# Patient Record
Sex: MALE | Race: WHITE
[De-identification: names, ages, dates, MRNs, and addresses within clinical notes are randomized per-mention and may not be internally consistent; named-entity substitution may affect disease eponyms.]

---

## 2019-12-02 ENCOUNTER — HOSPITAL ENCOUNTER (EMERGENCY)
Dept: HOSPITAL 94 - ER | Age: 34
Discharge: HOME | End: 2019-12-02
Payer: MEDICAID

## 2019-12-02 VITALS — HEIGHT: 74 IN | BODY MASS INDEX: 40.43 KG/M2 | WEIGHT: 315 LBS

## 2019-12-02 VITALS — DIASTOLIC BLOOD PRESSURE: 97 MMHG | SYSTOLIC BLOOD PRESSURE: 141 MMHG

## 2019-12-02 DIAGNOSIS — E11.9: ICD-10-CM

## 2019-12-02 DIAGNOSIS — J44.9: Primary | ICD-10-CM

## 2019-12-02 DIAGNOSIS — R11.2: ICD-10-CM

## 2019-12-02 DIAGNOSIS — F12.10: ICD-10-CM

## 2019-12-02 DIAGNOSIS — Z90.49: ICD-10-CM

## 2019-12-02 DIAGNOSIS — Z88.0: ICD-10-CM

## 2019-12-02 LAB
ALBUMIN SERPL BCP-MCNC: 3.4 G/DL (ref 3.4–5)
ALBUMIN/GLOB SERPL: 0.7 {RATIO} (ref 1.1–1.5)
ALP SERPL-CCNC: 103 IU/L (ref 46–116)
ALT SERPL W P-5'-P-CCNC: 42 U/L (ref 12–78)
ANION GAP SERPL CALCULATED.3IONS-SCNC: 8 MMOL/L (ref 8–16)
AST SERPL W P-5'-P-CCNC: 29 U/L (ref 10–37)
BASOPHILS # BLD AUTO: 0 X10'3 (ref 0–0.2)
BASOPHILS NFR BLD AUTO: 0.4 % (ref 0–1)
BILIRUB SERPL-MCNC: 0.3 MG/DL (ref 0.1–1)
BUN SERPL-MCNC: 10 MG/DL (ref 7–18)
BUN/CREAT SERPL: 11.5 (ref 5.4–32)
CALCIUM SERPL-MCNC: 8.7 MG/DL (ref 8.5–10.1)
CHLORIDE SERPL-SCNC: 98 MMOL/L (ref 99–107)
CO2 SERPL-SCNC: 32.9 MMOL/L (ref 24–32)
CREAT SERPL-MCNC: 0.87 MG/DL (ref 0.6–1.1)
EOSINOPHIL # BLD AUTO: 0 X10'3 (ref 0–0.9)
EOSINOPHIL NFR BLD AUTO: 0 % (ref 0–6)
ERYTHROCYTE [DISTWIDTH] IN BLOOD BY AUTOMATED COUNT: 14.9 % (ref 11.5–14.5)
GFR SERPL CREATININE-BSD FRML MDRD: > 90 ML/MIN
GLUCOSE SERPL-MCNC: 305 MG/DL (ref 70–104)
HCT VFR BLD AUTO: 45.4 % (ref 42–52)
HGB BLD-MCNC: 14.7 G/DL (ref 14–17.9)
LIPASE SERPL-CCNC: 86 U/L (ref 73–393)
LYMPHOCYTES # BLD AUTO: 0.7 X10'3 (ref 1.1–4.8)
LYMPHOCYTES NFR BLD AUTO: 13.8 % (ref 21–51)
MCH RBC QN AUTO: 25.8 PG (ref 27–31)
MCHC RBC AUTO-ENTMCNC: 32.4 G/DL (ref 33–36.5)
MCV RBC AUTO: 79.5 FL (ref 78–98)
MONOCYTES # BLD AUTO: 0.7 X10'3 (ref 0–0.9)
MONOCYTES NFR BLD AUTO: 12.4 % (ref 2–12)
NEUTROPHILS # BLD AUTO: 3.9 X10'3 (ref 1.8–7.7)
NEUTROPHILS NFR BLD AUTO: 73.4 % (ref 42–75)
PLATELET # BLD AUTO: 219 X10'3 (ref 140–440)
PMV BLD AUTO: 8.7 FL (ref 7.4–10.4)
POTASSIUM SERPL-SCNC: 3.8 MMOL/L (ref 3.5–5.1)
PROT SERPL-MCNC: 8.1 G/DL (ref 6.4–8.2)
RBC # BLD AUTO: 5.72 X10'6 (ref 4.7–6.1)
SODIUM SERPL-SCNC: 139 MMOL/L (ref 135–145)
WBC # BLD AUTO: 5.3 X10'3 (ref 4.5–11)

## 2019-12-02 PROCEDURE — 94760 N-INVAS EAR/PLS OXIMETRY 1: CPT

## 2019-12-02 PROCEDURE — 87040 BLOOD CULTURE FOR BACTERIA: CPT

## 2019-12-02 PROCEDURE — 99284 EMERGENCY DEPT VISIT MOD MDM: CPT

## 2019-12-02 PROCEDURE — 84145 PROCALCITONIN (PCT): CPT

## 2019-12-02 PROCEDURE — 96374 THER/PROPH/DIAG INJ IV PUSH: CPT

## 2019-12-02 PROCEDURE — 94640 AIRWAY INHALATION TREATMENT: CPT

## 2019-12-02 PROCEDURE — 85025 COMPLETE CBC W/AUTO DIFF WBC: CPT

## 2019-12-02 PROCEDURE — 96375 TX/PRO/DX INJ NEW DRUG ADDON: CPT

## 2019-12-02 PROCEDURE — 96361 HYDRATE IV INFUSION ADD-ON: CPT

## 2019-12-02 PROCEDURE — 83690 ASSAY OF LIPASE: CPT

## 2019-12-02 PROCEDURE — 36415 COLL VENOUS BLD VENIPUNCTURE: CPT

## 2019-12-02 PROCEDURE — 80053 COMPREHEN METABOLIC PANEL: CPT

## 2019-12-02 PROCEDURE — 93005 ELECTROCARDIOGRAM TRACING: CPT

## 2019-12-02 PROCEDURE — 83605 ASSAY OF LACTIC ACID: CPT

## 2022-12-12 ENCOUNTER — HOSPITAL ENCOUNTER (INPATIENT)
Facility: MEDICAL CENTER | Age: 37
LOS: 4 days | DRG: 603 | End: 2022-12-16
Attending: INTERNAL MEDICINE | Admitting: STUDENT IN AN ORGANIZED HEALTH CARE EDUCATION/TRAINING PROGRAM
Payer: COMMERCIAL

## 2022-12-12 DIAGNOSIS — E13.628 OTHER SPECIFIED DIABETES MELLITUS WITH OTHER SKIN COMPLICATION, UNSPECIFIED WHETHER LONG TERM INSULIN USE (HCC): ICD-10-CM

## 2022-12-12 DIAGNOSIS — L02.215 PERINEAL ABSCESS: ICD-10-CM

## 2022-12-12 PROCEDURE — 770001 HCHG ROOM/CARE - MED/SURG/GYN PRIV*

## 2022-12-12 RX ORDER — ACETAMINOPHEN 325 MG/1
650 TABLET ORAL EVERY 6 HOURS PRN
Status: ACTIVE | OUTPATIENT
Start: 2022-12-12 | End: 2022-12-13

## 2022-12-12 RX ORDER — PRAZOSIN HYDROCHLORIDE 2 MG/1
4 CAPSULE ORAL NIGHTLY
COMMUNITY

## 2022-12-12 ASSESSMENT — PATIENT HEALTH QUESTIONNAIRE - PHQ9
1. LITTLE INTEREST OR PLEASURE IN DOING THINGS: NOT AT ALL
2. FEELING DOWN, DEPRESSED, IRRITABLE, OR HOPELESS: NOT AT ALL
SUM OF ALL RESPONSES TO PHQ9 QUESTIONS 1 AND 2: 0

## 2022-12-12 NOTE — PROGRESS NOTES
[facility]PITAL[facility] TRIAGE OFFICER DIRECT ADMISSION REPORT  Transferring facility: [facility] in [place]  Transferring physician: Dr Lobo  Transferring facility/physician contact number:   Chief complaint: Pain in his perineum  Pertinent history & patient course: Patient recently had a perineal abscess that was drained in [place] by a PA, it was deemed superficial at that time.  Patient was given antibiotics and completed his course of antibiotics.  Patient states shortly thereafter he began having pain and swelling again.  Because of that he presented to the emergency department.  Pertinent imaging & lab results: Perineal abscess on CT scan  Code Status: Full per transferring provider, I personally verified with the transferring provider patient's code status and the transferring provider has confirmed this with the patient.  Further work up or recommendations per triage officer prior to transfer: None  Consultants called prior to transfer and pertinent input from consultants: None  Patient accepted for transfer: Yes  Consultants to be called upon arrival: Will likely need surgical intervention, I do not anticipate resolution with IV antibiotics only  Admission status: Inpatient.   Floor requested: MedSur  If ICU transfer, name of intensivist case discussed with and pertinent input from critical care: N/A    Please inform the triage officer upon arrival of the patient to [facility] for assignment of a hospitalist to perform admission.     For any question or concerns regarding the care of this patient, please reach out to the assigned hospitalist.

## 2022-12-13 ENCOUNTER — ANESTHESIA EVENT (OUTPATIENT)
Dept: SURGERY | Facility: MEDICAL CENTER | Age: 37
DRG: 603 | End: 2022-12-13
Payer: COMMERCIAL

## 2022-12-13 ENCOUNTER — ANESTHESIA (OUTPATIENT)
Dept: SURGERY | Facility: MEDICAL CENTER | Age: 37
DRG: 603 | End: 2022-12-13
Payer: COMMERCIAL

## 2022-12-13 ENCOUNTER — HOSPITAL ENCOUNTER (OUTPATIENT)
Dept: RADIOLOGY | Facility: MEDICAL CENTER | Age: 37
End: 2022-12-13

## 2022-12-13 ENCOUNTER — APPOINTMENT (OUTPATIENT)
Dept: RADIOLOGY | Facility: MEDICAL CENTER | Age: 37
DRG: 603 | End: 2022-12-13
Attending: INTERNAL MEDICINE
Payer: COMMERCIAL

## 2022-12-13 PROBLEM — E87.1 HYPONATREMIA: Status: ACTIVE | Noted: 2022-12-13

## 2022-12-13 PROBLEM — E66.01 MORBID OBESITY WITH BMI OF 60.0-69.9, ADULT (HCC): Status: ACTIVE | Noted: 2022-12-13

## 2022-12-13 PROBLEM — E66.9 SUPER OBESE: Status: ACTIVE | Noted: 2022-12-13

## 2022-12-13 PROBLEM — E66.9 SUPER OBESE: Status: RESOLVED | Noted: 2022-12-13 | Resolved: 2022-12-13

## 2022-12-13 PROBLEM — E11.9 DIABETES MELLITUS (HCC): Status: ACTIVE | Noted: 2022-12-13

## 2022-12-13 LAB
ALBUMIN SERPL BCP-MCNC: 3.8 G/DL (ref 3.2–4.9)
ALBUMIN/GLOB SERPL: 1 G/DL
ALP SERPL-CCNC: 89 U/L (ref 30–99)
ALT SERPL-CCNC: 19 U/L (ref 2–50)
ANION GAP SERPL CALC-SCNC: 8 MMOL/L (ref 7–16)
AST SERPL-CCNC: 17 U/L (ref 12–45)
BASOPHILS # BLD AUTO: 0.9 % (ref 0–1.8)
BASOPHILS # BLD: 0.1 K/UL (ref 0–0.12)
BILIRUB SERPL-MCNC: 0.5 MG/DL (ref 0.1–1.5)
BUN SERPL-MCNC: 13 MG/DL (ref 8–22)
CALCIUM SERPL-MCNC: 9.1 MG/DL (ref 8.5–10.5)
CHLORIDE SERPL-SCNC: 99 MMOL/L (ref 96–112)
CO2 SERPL-SCNC: 27 MMOL/L (ref 20–33)
CREAT SERPL-MCNC: 0.65 MG/DL (ref 0.5–1.4)
EKG IMPRESSION: NORMAL
EOSINOPHIL # BLD AUTO: 0.46 K/UL (ref 0–0.51)
EOSINOPHIL NFR BLD: 4 % (ref 0–6.9)
ERYTHROCYTE [DISTWIDTH] IN BLOOD BY AUTOMATED COUNT: 41.2 FL (ref 35.9–50)
EST. AVERAGE GLUCOSE BLD GHB EST-MCNC: 163 MG/DL
GFR SERPLBLD CREATININE-BSD FMLA CKD-EPI: 125 ML/MIN/1.73 M 2
GLOBULIN SER CALC-MCNC: 3.7 G/DL (ref 1.9–3.5)
GLUCOSE BLD STRIP.AUTO-MCNC: 130 MG/DL (ref 65–99)
GLUCOSE BLD STRIP.AUTO-MCNC: 160 MG/DL (ref 65–99)
GLUCOSE BLD STRIP.AUTO-MCNC: 164 MG/DL (ref 65–99)
GLUCOSE BLD STRIP.AUTO-MCNC: 199 MG/DL (ref 65–99)
GLUCOSE SERPL-MCNC: 142 MG/DL (ref 65–99)
GRAM STN SPEC: NORMAL
GRAM STN SPEC: NORMAL
HBA1C MFR BLD: 7.3 % (ref 4–5.6)
HCT VFR BLD AUTO: 45 % (ref 42–52)
HGB BLD-MCNC: 14.1 G/DL (ref 14–18)
IMM GRANULOCYTES # BLD AUTO: 0.03 K/UL (ref 0–0.11)
IMM GRANULOCYTES NFR BLD AUTO: 0.3 % (ref 0–0.9)
LACTATE SERPL-SCNC: 1.1 MMOL/L (ref 0.5–2)
LACTATE SERPL-SCNC: 1.2 MMOL/L (ref 0.5–2)
LYMPHOCYTES # BLD AUTO: 2.34 K/UL (ref 1–4.8)
LYMPHOCYTES NFR BLD: 20.3 % (ref 22–41)
MCH RBC QN AUTO: 25.4 PG (ref 27–33)
MCHC RBC AUTO-ENTMCNC: 31.3 G/DL (ref 33.7–35.3)
MCV RBC AUTO: 80.9 FL (ref 81.4–97.8)
MONOCYTES # BLD AUTO: 0.81 K/UL (ref 0–0.85)
MONOCYTES NFR BLD AUTO: 7 % (ref 0–13.4)
NEUTROPHILS # BLD AUTO: 7.81 K/UL (ref 1.82–7.42)
NEUTROPHILS NFR BLD: 67.5 % (ref 44–72)
NRBC # BLD AUTO: 0 K/UL
NRBC BLD-RTO: 0 /100 WBC
PLATELET # BLD AUTO: 301 K/UL (ref 164–446)
PMV BLD AUTO: 9.8 FL (ref 9–12.9)
POTASSIUM SERPL-SCNC: 4.1 MMOL/L (ref 3.6–5.5)
PROCALCITONIN SERPL-MCNC: 0.07 NG/ML
PROT SERPL-MCNC: 7.5 G/DL (ref 6–8.2)
RBC # BLD AUTO: 5.56 M/UL (ref 4.7–6.1)
SCCMEC + MECA PNL NOSE NAA+PROBE: NEGATIVE
SIGNIFICANT IND 70042: NORMAL
SIGNIFICANT IND 70042: NORMAL
SITE SITE: NORMAL
SITE SITE: NORMAL
SODIUM SERPL-SCNC: 134 MMOL/L (ref 135–145)
SOURCE SOURCE: NORMAL
SOURCE SOURCE: NORMAL
WBC # BLD AUTO: 11.6 K/UL (ref 4.8–10.8)

## 2022-12-13 PROCEDURE — 700111 HCHG RX REV CODE 636 W/ 250 OVERRIDE (IP): Performed by: STUDENT IN AN ORGANIZED HEALTH CARE EDUCATION/TRAINING PROGRAM

## 2022-12-13 PROCEDURE — 0Y900ZZ DRAINAGE OF RIGHT BUTTOCK, OPEN APPROACH: ICD-10-PCS | Performed by: SURGERY

## 2022-12-13 PROCEDURE — 700101 HCHG RX REV CODE 250: Performed by: STUDENT IN AN ORGANIZED HEALTH CARE EDUCATION/TRAINING PROGRAM

## 2022-12-13 PROCEDURE — A9270 NON-COVERED ITEM OR SERVICE: HCPCS | Performed by: INTERNAL MEDICINE

## 2022-12-13 PROCEDURE — 700105 HCHG RX REV CODE 258: Performed by: STUDENT IN AN ORGANIZED HEALTH CARE EDUCATION/TRAINING PROGRAM

## 2022-12-13 PROCEDURE — 83605 ASSAY OF LACTIC ACID: CPT | Mod: 91

## 2022-12-13 PROCEDURE — 10061 I&D ABSCESS COMP/MULTIPLE: CPT | Performed by: SURGERY

## 2022-12-13 PROCEDURE — 700102 HCHG RX REV CODE 250 W/ 637 OVERRIDE(OP): Performed by: INTERNAL MEDICINE

## 2022-12-13 PROCEDURE — 87040 BLOOD CULTURE FOR BACTERIA: CPT | Mod: 91

## 2022-12-13 PROCEDURE — 99140 ANES COMP EMERGENCY COND: CPT | Performed by: STUDENT IN AN ORGANIZED HEALTH CARE EDUCATION/TRAINING PROGRAM

## 2022-12-13 PROCEDURE — 00400 ANES INTEGUMENTARY SYS NOS: CPT | Performed by: STUDENT IN AN ORGANIZED HEALTH CARE EDUCATION/TRAINING PROGRAM

## 2022-12-13 PROCEDURE — 770001 HCHG ROOM/CARE - MED/SURG/GYN PRIV*

## 2022-12-13 PROCEDURE — 160009 HCHG ANES TIME/MIN: Performed by: SURGERY

## 2022-12-13 PROCEDURE — 87205 SMEAR GRAM STAIN: CPT | Mod: 91

## 2022-12-13 PROCEDURE — 160035 HCHG PACU - 1ST 60 MINS PHASE I: Performed by: SURGERY

## 2022-12-13 PROCEDURE — 700105 HCHG RX REV CODE 258: Performed by: INTERNAL MEDICINE

## 2022-12-13 PROCEDURE — 87075 CULTR BACTERIA EXCEPT BLOOD: CPT

## 2022-12-13 PROCEDURE — A9270 NON-COVERED ITEM OR SERVICE: HCPCS | Performed by: STUDENT IN AN ORGANIZED HEALTH CARE EDUCATION/TRAINING PROGRAM

## 2022-12-13 PROCEDURE — 160027 HCHG SURGERY MINUTES - 1ST 30 MINS LEVEL 2: Performed by: SURGERY

## 2022-12-13 PROCEDURE — 93010 ELECTROCARDIOGRAM REPORT: CPT | Performed by: INTERNAL MEDICINE

## 2022-12-13 PROCEDURE — 87641 MR-STAPH DNA AMP PROBE: CPT

## 2022-12-13 PROCEDURE — 87077 CULTURE AEROBIC IDENTIFY: CPT

## 2022-12-13 PROCEDURE — 36415 COLL VENOUS BLD VENIPUNCTURE: CPT

## 2022-12-13 PROCEDURE — 93005 ELECTROCARDIOGRAM TRACING: CPT | Performed by: SURGERY

## 2022-12-13 PROCEDURE — 87147 CULTURE TYPE IMMUNOLOGIC: CPT

## 2022-12-13 PROCEDURE — 80053 COMPREHEN METABOLIC PANEL: CPT

## 2022-12-13 PROCEDURE — 99253 IP/OBS CNSLTJ NEW/EST LOW 45: CPT | Mod: 57 | Performed by: SURGERY

## 2022-12-13 PROCEDURE — 700102 HCHG RX REV CODE 250 W/ 637 OVERRIDE(OP): Performed by: STUDENT IN AN ORGANIZED HEALTH CARE EDUCATION/TRAINING PROGRAM

## 2022-12-13 PROCEDURE — 99223 1ST HOSP IP/OBS HIGH 75: CPT | Performed by: STUDENT IN AN ORGANIZED HEALTH CARE EDUCATION/TRAINING PROGRAM

## 2022-12-13 PROCEDURE — 83036 HEMOGLOBIN GLYCOSYLATED A1C: CPT

## 2022-12-13 PROCEDURE — 160036 HCHG PACU - EA ADDL 30 MINS PHASE I: Performed by: SURGERY

## 2022-12-13 PROCEDURE — 87070 CULTURE OTHR SPECIMN AEROBIC: CPT | Mod: 91

## 2022-12-13 PROCEDURE — 160002 HCHG RECOVERY MINUTES (STAT): Performed by: SURGERY

## 2022-12-13 PROCEDURE — 85025 COMPLETE CBC W/AUTO DIFF WBC: CPT

## 2022-12-13 PROCEDURE — 87186 SC STD MICRODIL/AGAR DIL: CPT

## 2022-12-13 PROCEDURE — 700111 HCHG RX REV CODE 636 W/ 250 OVERRIDE (IP): Performed by: INTERNAL MEDICINE

## 2022-12-13 PROCEDURE — 160038 HCHG SURGERY MINUTES - EA ADDL 1 MIN LEVEL 2: Performed by: SURGERY

## 2022-12-13 PROCEDURE — 84145 PROCALCITONIN (PCT): CPT

## 2022-12-13 PROCEDURE — 160048 HCHG OR STATISTICAL LEVEL 1-5: Performed by: SURGERY

## 2022-12-13 PROCEDURE — 82962 GLUCOSE BLOOD TEST: CPT | Mod: 91

## 2022-12-13 RX ORDER — SODIUM CHLORIDE, SODIUM LACTATE, POTASSIUM CHLORIDE, CALCIUM CHLORIDE 600; 310; 30; 20 MG/100ML; MG/100ML; MG/100ML; MG/100ML
INJECTION, SOLUTION INTRAVENOUS CONTINUOUS
Status: DISCONTINUED | OUTPATIENT
Start: 2022-12-13 | End: 2022-12-14

## 2022-12-13 RX ORDER — MEPERIDINE HYDROCHLORIDE 25 MG/ML
6.25 INJECTION INTRAMUSCULAR; INTRAVENOUS; SUBCUTANEOUS
Status: DISCONTINUED | OUTPATIENT
Start: 2022-12-13 | End: 2022-12-13 | Stop reason: HOSPADM

## 2022-12-13 RX ORDER — ONDANSETRON 2 MG/ML
4 INJECTION INTRAMUSCULAR; INTRAVENOUS EVERY 4 HOURS PRN
Status: DISCONTINUED | OUTPATIENT
Start: 2022-12-13 | End: 2022-12-16 | Stop reason: HOSPADM

## 2022-12-13 RX ORDER — HYDROMORPHONE HYDROCHLORIDE 1 MG/ML
0.5 INJECTION, SOLUTION INTRAMUSCULAR; INTRAVENOUS; SUBCUTANEOUS
Status: DISCONTINUED | OUTPATIENT
Start: 2022-12-13 | End: 2022-12-14

## 2022-12-13 RX ORDER — SODIUM CHLORIDE, SODIUM LACTATE, POTASSIUM CHLORIDE, CALCIUM CHLORIDE 600; 310; 30; 20 MG/100ML; MG/100ML; MG/100ML; MG/100ML
INJECTION, SOLUTION INTRAVENOUS CONTINUOUS
Status: DISCONTINUED | OUTPATIENT
Start: 2022-12-13 | End: 2022-12-13 | Stop reason: HOSPADM

## 2022-12-13 RX ORDER — PROMETHAZINE HYDROCHLORIDE 25 MG/1
12.5-25 SUPPOSITORY RECTAL EVERY 4 HOURS PRN
Status: DISCONTINUED | OUTPATIENT
Start: 2022-12-13 | End: 2022-12-16 | Stop reason: HOSPADM

## 2022-12-13 RX ORDER — SODIUM CHLORIDE, SODIUM LACTATE, POTASSIUM CHLORIDE, CALCIUM CHLORIDE 600; 310; 30; 20 MG/100ML; MG/100ML; MG/100ML; MG/100ML
INJECTION, SOLUTION INTRAVENOUS
Status: DISCONTINUED | OUTPATIENT
Start: 2022-12-13 | End: 2022-12-13 | Stop reason: SURG

## 2022-12-13 RX ORDER — KETOROLAC TROMETHAMINE 30 MG/ML
30 INJECTION, SOLUTION INTRAMUSCULAR; INTRAVENOUS ONCE
Status: COMPLETED | OUTPATIENT
Start: 2022-12-13 | End: 2022-12-13

## 2022-12-13 RX ORDER — HYDROMORPHONE HYDROCHLORIDE 1 MG/ML
0.1 INJECTION, SOLUTION INTRAMUSCULAR; INTRAVENOUS; SUBCUTANEOUS
Status: DISCONTINUED | OUTPATIENT
Start: 2022-12-13 | End: 2022-12-13 | Stop reason: HOSPADM

## 2022-12-13 RX ORDER — OXYCODONE HYDROCHLORIDE 10 MG/1
10 TABLET ORAL EVERY 4 HOURS PRN
Status: DISCONTINUED | OUTPATIENT
Start: 2022-12-13 | End: 2022-12-13

## 2022-12-13 RX ORDER — ROCURONIUM BROMIDE 10 MG/ML
INJECTION, SOLUTION INTRAVENOUS PRN
Status: DISCONTINUED | OUTPATIENT
Start: 2022-12-13 | End: 2022-12-13 | Stop reason: SURG

## 2022-12-13 RX ORDER — IBUPROFEN 800 MG/1
800 TABLET ORAL 3 TIMES DAILY PRN
Status: DISCONTINUED | OUTPATIENT
Start: 2022-12-16 | End: 2022-12-16 | Stop reason: HOSPADM

## 2022-12-13 RX ORDER — MIDAZOLAM HYDROCHLORIDE 1 MG/ML
INJECTION INTRAMUSCULAR; INTRAVENOUS PRN
Status: DISCONTINUED | OUTPATIENT
Start: 2022-12-13 | End: 2022-12-13 | Stop reason: SURG

## 2022-12-13 RX ORDER — DIPHENHYDRAMINE HYDROCHLORIDE 50 MG/ML
12.5 INJECTION INTRAMUSCULAR; INTRAVENOUS
Status: DISCONTINUED | OUTPATIENT
Start: 2022-12-13 | End: 2022-12-13 | Stop reason: HOSPADM

## 2022-12-13 RX ORDER — KETOROLAC TROMETHAMINE 30 MG/ML
30 INJECTION, SOLUTION INTRAMUSCULAR; INTRAVENOUS EVERY 6 HOURS
Status: DISCONTINUED | OUTPATIENT
Start: 2022-12-13 | End: 2022-12-16 | Stop reason: HOSPADM

## 2022-12-13 RX ORDER — ACETAMINOPHEN 500 MG
TABLET ORAL
Status: COMPLETED
Start: 2022-12-13 | End: 2022-12-13

## 2022-12-13 RX ORDER — HYDROMORPHONE HYDROCHLORIDE 2 MG/ML
INJECTION, SOLUTION INTRAMUSCULAR; INTRAVENOUS; SUBCUTANEOUS PRN
Status: DISCONTINUED | OUTPATIENT
Start: 2022-12-13 | End: 2022-12-13 | Stop reason: SURG

## 2022-12-13 RX ORDER — ONDANSETRON 2 MG/ML
4 INJECTION INTRAMUSCULAR; INTRAVENOUS
Status: DISCONTINUED | OUTPATIENT
Start: 2022-12-13 | End: 2022-12-13 | Stop reason: HOSPADM

## 2022-12-13 RX ORDER — OXYCODONE HCL 5 MG/5 ML
10 SOLUTION, ORAL ORAL
Status: COMPLETED | OUTPATIENT
Start: 2022-12-13 | End: 2022-12-13

## 2022-12-13 RX ORDER — LABETALOL HYDROCHLORIDE 5 MG/ML
5 INJECTION, SOLUTION INTRAVENOUS
Status: DISCONTINUED | OUTPATIENT
Start: 2022-12-13 | End: 2022-12-13 | Stop reason: HOSPADM

## 2022-12-13 RX ORDER — PROCHLORPERAZINE EDISYLATE 5 MG/ML
5-10 INJECTION INTRAMUSCULAR; INTRAVENOUS EVERY 4 HOURS PRN
Status: DISCONTINUED | OUTPATIENT
Start: 2022-12-13 | End: 2022-12-16 | Stop reason: HOSPADM

## 2022-12-13 RX ORDER — OXYCODONE HYDROCHLORIDE 5 MG/1
2.5 TABLET ORAL
Status: DISCONTINUED | OUTPATIENT
Start: 2022-12-13 | End: 2022-12-14

## 2022-12-13 RX ORDER — HALOPERIDOL 5 MG/ML
1 INJECTION INTRAMUSCULAR
Status: DISCONTINUED | OUTPATIENT
Start: 2022-12-13 | End: 2022-12-13 | Stop reason: HOSPADM

## 2022-12-13 RX ORDER — PRAZOSIN HYDROCHLORIDE 2 MG/1
4 CAPSULE ORAL NIGHTLY
Status: DISCONTINUED | OUTPATIENT
Start: 2022-12-13 | End: 2022-12-16 | Stop reason: HOSPADM

## 2022-12-13 RX ORDER — PROMETHAZINE HYDROCHLORIDE 25 MG/1
12.5-25 TABLET ORAL EVERY 4 HOURS PRN
Status: DISCONTINUED | OUTPATIENT
Start: 2022-12-13 | End: 2022-12-16 | Stop reason: HOSPADM

## 2022-12-13 RX ORDER — OXYCODONE HYDROCHLORIDE 5 MG/1
5 TABLET ORAL
Status: DISCONTINUED | OUTPATIENT
Start: 2022-12-13 | End: 2022-12-14

## 2022-12-13 RX ORDER — HYDROMORPHONE HYDROCHLORIDE 1 MG/ML
0.2 INJECTION, SOLUTION INTRAMUSCULAR; INTRAVENOUS; SUBCUTANEOUS
Status: DISCONTINUED | OUTPATIENT
Start: 2022-12-13 | End: 2022-12-13 | Stop reason: HOSPADM

## 2022-12-13 RX ORDER — HYDRALAZINE HYDROCHLORIDE 20 MG/ML
5 INJECTION INTRAMUSCULAR; INTRAVENOUS
Status: DISCONTINUED | OUTPATIENT
Start: 2022-12-13 | End: 2022-12-13 | Stop reason: HOSPADM

## 2022-12-13 RX ORDER — METRONIDAZOLE 500 MG/100ML
500 INJECTION, SOLUTION INTRAVENOUS EVERY 8 HOURS
Status: DISCONTINUED | OUTPATIENT
Start: 2022-12-13 | End: 2022-12-15

## 2022-12-13 RX ORDER — ACETAMINOPHEN 500 MG
1000 TABLET ORAL EVERY 6 HOURS
Status: DISCONTINUED | OUTPATIENT
Start: 2022-12-13 | End: 2022-12-16 | Stop reason: HOSPADM

## 2022-12-13 RX ORDER — OXYCODONE HCL 5 MG/5 ML
5 SOLUTION, ORAL ORAL
Status: COMPLETED | OUTPATIENT
Start: 2022-12-13 | End: 2022-12-13

## 2022-12-13 RX ORDER — ONDANSETRON 4 MG/1
4 TABLET, ORALLY DISINTEGRATING ORAL EVERY 4 HOURS PRN
Status: DISCONTINUED | OUTPATIENT
Start: 2022-12-13 | End: 2022-12-16 | Stop reason: HOSPADM

## 2022-12-13 RX ORDER — HYDROMORPHONE HYDROCHLORIDE 1 MG/ML
1 INJECTION, SOLUTION INTRAMUSCULAR; INTRAVENOUS; SUBCUTANEOUS
Status: DISCONTINUED | OUTPATIENT
Start: 2022-12-13 | End: 2022-12-13

## 2022-12-13 RX ORDER — LINEZOLID 2 MG/ML
600 INJECTION, SOLUTION INTRAVENOUS EVERY 12 HOURS
Status: DISCONTINUED | OUTPATIENT
Start: 2022-12-13 | End: 2022-12-14

## 2022-12-13 RX ORDER — HYDROMORPHONE HYDROCHLORIDE 1 MG/ML
0.5 INJECTION, SOLUTION INTRAMUSCULAR; INTRAVENOUS; SUBCUTANEOUS
Status: DISCONTINUED | OUTPATIENT
Start: 2022-12-13 | End: 2022-12-13 | Stop reason: HOSPADM

## 2022-12-13 RX ORDER — ACETAMINOPHEN 500 MG
1000 TABLET ORAL EVERY 6 HOURS PRN
Status: DISCONTINUED | OUTPATIENT
Start: 2022-12-18 | End: 2022-12-16 | Stop reason: HOSPADM

## 2022-12-13 RX ADMIN — KETOROLAC TROMETHAMINE 30 MG: 30 INJECTION, SOLUTION INTRAMUSCULAR; INTRAVENOUS at 11:41

## 2022-12-13 RX ADMIN — SODIUM CHLORIDE, POTASSIUM CHLORIDE, SODIUM LACTATE AND CALCIUM CHLORIDE: 600; 310; 30; 20 INJECTION, SOLUTION INTRAVENOUS at 00:56

## 2022-12-13 RX ADMIN — HYDROMORPHONE HYDROCHLORIDE 0.5 MG: 1 INJECTION, SOLUTION INTRAMUSCULAR; INTRAVENOUS; SUBCUTANEOUS at 18:02

## 2022-12-13 RX ADMIN — HYDROMORPHONE HYDROCHLORIDE 0.5 MG: 1 INJECTION, SOLUTION INTRAMUSCULAR; INTRAVENOUS; SUBCUTANEOUS at 23:44

## 2022-12-13 RX ADMIN — HYDROMORPHONE HYDROCHLORIDE 1 MG: 1 INJECTION, SOLUTION INTRAMUSCULAR; INTRAVENOUS; SUBCUTANEOUS at 13:15

## 2022-12-13 RX ADMIN — HYDROMORPHONE HYDROCHLORIDE 1 MG: 1 INJECTION, SOLUTION INTRAMUSCULAR; INTRAVENOUS; SUBCUTANEOUS at 00:51

## 2022-12-13 RX ADMIN — SODIUM CHLORIDE, POTASSIUM CHLORIDE, SODIUM LACTATE AND CALCIUM CHLORIDE: 600; 310; 30; 20 INJECTION, SOLUTION INTRAVENOUS at 21:53

## 2022-12-13 RX ADMIN — MIDAZOLAM HYDROCHLORIDE 4 MG: 1 INJECTION, SOLUTION INTRAMUSCULAR; INTRAVENOUS at 10:28

## 2022-12-13 RX ADMIN — NICOTINE POLACRILEX 2 MG: 2 GUM, CHEWING BUCCAL at 18:11

## 2022-12-13 RX ADMIN — OXYCODONE 5 MG: 5 TABLET ORAL at 21:40

## 2022-12-13 RX ADMIN — METRONIDAZOLE 500 MG: 5 INJECTION, SOLUTION INTRAVENOUS at 13:19

## 2022-12-13 RX ADMIN — METRONIDAZOLE 500 MG: 5 INJECTION, SOLUTION INTRAVENOUS at 21:44

## 2022-12-13 RX ADMIN — LINEZOLID 600 MG: 600 INJECTION, SOLUTION INTRAVENOUS at 18:59

## 2022-12-13 RX ADMIN — OXYCODONE HYDROCHLORIDE 10 MG: 10 TABLET ORAL at 01:43

## 2022-12-13 RX ADMIN — PROPOFOL 200 MG: 10 INJECTION, EMULSION INTRAVENOUS at 10:39

## 2022-12-13 RX ADMIN — SODIUM CHLORIDE, POTASSIUM CHLORIDE, SODIUM LACTATE AND CALCIUM CHLORIDE: 600; 310; 30; 20 INJECTION, SOLUTION INTRAVENOUS at 10:28

## 2022-12-13 RX ADMIN — CEFEPIME 2 G: 2 INJECTION, POWDER, FOR SOLUTION INTRAVENOUS at 18:01

## 2022-12-13 RX ADMIN — PRAZOSIN HYDROCHLORIDE 4 MG: 2 CAPSULE ORAL at 21:40

## 2022-12-13 RX ADMIN — METRONIDAZOLE 500 MG: 5 INJECTION, SOLUTION INTRAVENOUS at 02:43

## 2022-12-13 RX ADMIN — SUGAMMADEX 400 MG: 100 INJECTION, SOLUTION INTRAVENOUS at 11:10

## 2022-12-13 RX ADMIN — LINEZOLID 600 MG: 600 INJECTION, SOLUTION INTRAVENOUS at 06:49

## 2022-12-13 RX ADMIN — INSULIN HUMAN 1 UNITS: 100 INJECTION, SOLUTION PARENTERAL at 13:21

## 2022-12-13 RX ADMIN — HYDROMORPHONE HYDROCHLORIDE 1 MG: 1 INJECTION, SOLUTION INTRAMUSCULAR; INTRAVENOUS; SUBCUTANEOUS at 04:33

## 2022-12-13 RX ADMIN — HYDROMORPHONE HYDROCHLORIDE 0.5 MCG: 2 INJECTION INTRAMUSCULAR; INTRAVENOUS; SUBCUTANEOUS at 10:28

## 2022-12-13 RX ADMIN — HYDROMORPHONE HYDROCHLORIDE 1 MG: 1 INJECTION, SOLUTION INTRAMUSCULAR; INTRAVENOUS; SUBCUTANEOUS at 09:22

## 2022-12-13 RX ADMIN — OXYCODONE HYDROCHLORIDE 10 MG: 10 TABLET ORAL at 06:01

## 2022-12-13 RX ADMIN — ALBUTEROL SULFATE 2.5 MG: 2.5 SOLUTION RESPIRATORY (INHALATION) at 11:32

## 2022-12-13 RX ADMIN — ROCURONIUM BROMIDE 100 MG: 10 INJECTION, SOLUTION INTRAVENOUS at 10:39

## 2022-12-13 RX ADMIN — CEFEPIME 2 G: 2 INJECTION, POWDER, FOR SOLUTION INTRAVENOUS at 05:11

## 2022-12-13 RX ADMIN — KETOROLAC TROMETHAMINE 30 MG: 30 INJECTION, SOLUTION INTRAMUSCULAR; INTRAVENOUS at 23:45

## 2022-12-13 RX ADMIN — OXYCODONE HYDROCHLORIDE 10 MG: 5 SOLUTION ORAL at 11:39

## 2022-12-13 RX ADMIN — OXYCODONE HYDROCHLORIDE 10 MG: 10 TABLET ORAL at 15:41

## 2022-12-13 ASSESSMENT — PAIN SCALES - GENERAL: PAIN_LEVEL: 5

## 2022-12-13 ASSESSMENT — LIFESTYLE VARIABLES
AVERAGE NUMBER OF DAYS PER WEEK YOU HAVE A DRINK CONTAINING ALCOHOL: 0
HAVE PEOPLE ANNOYED YOU BY CRITICIZING YOUR DRINKING: NO
ALCOHOL_USE: YES
EVER FELT BAD OR GUILTY ABOUT YOUR DRINKING: NO
ON A TYPICAL DAY WHEN YOU DRINK ALCOHOL HOW MANY DRINKS DO YOU HAVE: 2
TOTAL SCORE: 0
HOW MANY TIMES IN THE PAST YEAR HAVE YOU HAD 5 OR MORE DRINKS IN A DAY: 0
TOTAL SCORE: 0
TOTAL SCORE: 0
HAVE YOU EVER FELT YOU SHOULD CUT DOWN ON YOUR DRINKING: NO
CONSUMPTION TOTAL: NEGATIVE
EVER HAD A DRINK FIRST THING IN THE MORNING TO STEADY YOUR NERVES TO GET RID OF A HANGOVER: NO

## 2022-12-13 ASSESSMENT — PAIN DESCRIPTION - PAIN TYPE
TYPE: ACUTE PAIN
TYPE: ACUTE PAIN
TYPE: SURGICAL PAIN
TYPE: ACUTE PAIN

## 2022-12-13 ASSESSMENT — COGNITIVE AND FUNCTIONAL STATUS - GENERAL
SUGGESTED CMS G CODE MODIFIER MOBILITY: CH
MOBILITY SCORE: 24
DAILY ACTIVITIY SCORE: 24
SUGGESTED CMS G CODE MODIFIER DAILY ACTIVITY: CH

## 2022-12-13 ASSESSMENT — ENCOUNTER SYMPTOMS
BRUISES/BLEEDS EASILY: 0
FEVER: 0
HEADACHES: 0
PALPITATIONS: 0
MYALGIAS: 0
FLANK PAIN: 0
ABDOMINAL PAIN: 0
DIAPHORESIS: 1
HEMOPTYSIS: 0
DOUBLE VISION: 0
NECK PAIN: 0
HEARTBURN: 0
BLURRED VISION: 0
CHILLS: 0
DEPRESSION: 0
COUGH: 0
DIZZINESS: 0
VOMITING: 0
NAUSEA: 0

## 2022-12-13 NOTE — ASSESSMENT & PLAN NOTE
Failed outpatient abx's and I*D  Change abx's to oral cipro/flagyl  F/u cultures, remain ngtd (unlikely to grow given prior abx's before cultures obtained)  Underwent intra-operative I*D on 12/13, no e/o necrotizing fasciitis  No changes to management at this time, adjust pain meds (minimal usage)  WBC normalized, afebrile, wound appears fine  Discharge home on 12/16 with MTM

## 2022-12-13 NOTE — ANESTHESIA PREPROCEDURE EVALUATION
Case: [id number] Date/Time: 12/13/22 0845    Procedure: INCISION AND DRAINAGE, ABSCESS, PERINEAL    Location: [facility] OR 06 / SURGERY [facility]    Surgeons: Jack Headley D.O.          Relevant Problems   No relevant active problems       Physical Exam    Airway   Mallampati: IV  TM distance: >3 FB  Neck ROM: full       Cardiovascular - normal exam  Rhythm: regular  Rate: normal  (-) murmur     Dental - normal exam             Pulmonary - normal exam  Breath sounds clear to auscultation     Abdominal   (+) obese     Neurological - normal exam                 Anesthesia Plan    ASA 3- EMERGENT   ASA physical status 3 criteria: morbid obesity - BMI greater than or equal to 40ASA physical status emergent criteria: acutely contaminated wound or identified infection source    Plan - general       Airway plan will be ETT          Induction: intravenous    Postoperative Plan: Postoperative administration of opioids is intended.    Pertinent diagnostic labs and testing reviewed    Informed Consent:    Anesthetic plan and risks discussed with patient.    Use of blood products discussed with: patient whom consented to blood products.

## 2022-12-13 NOTE — H&P
Hospital Medicine History & Physical Note    Date of Service  12/13/2022    Primary Care Physician  No primary care provider on file.    Consultants  urology - Dr. Russell  Surgery - Dr. Zuñiga     Code Status  Full Code    Chief Complaint  Perineal Abscess       History of Presenting Illness  Logan Walls is a 37 y.o. male past medical history of remote diabetes not on any medications, morbid obesity who presented 12/12/2022 with pain, swelling and discharge from his cranium.  Patient denies any fever or chills.  Denies any trauma to the area.  He reports he had a scrotal abscess on Nov 18 had I&D and was discharged on macrobid. He denies alcohol use but does report marijuana use. At outside facility he met criteria for sepsis including tachycardia and leukocytosis and was given metronidazole, levofloxacin and clindamycin. Ultrasound at OSH showed a complex fluid collection compatible with an abscess at the perineum 7 x 2.8 x 3.8 cm. There is no internal blood flow. This fluid collection has become more liquefied and circumscribed and larger since prior sonogram. CT of the Abdomen/Pelvis done showed right inferior perineal subcutaneous soft tissue compatible with phlegmon and early abscess. Tiny foci of gas.       I discussed the plan of care with patient.    Review of Systems  Review of Systems   Constitutional:  Negative for chills and fever.   HENT:  Negative for hearing loss and tinnitus.    Eyes:  Negative for blurred vision and double vision.   Respiratory:  Negative for cough and hemoptysis.    Cardiovascular:  Negative for chest pain and palpitations.   Gastrointestinal:  Negative for heartburn and nausea.   Genitourinary:  Negative for dysuria and urgency.        Perineal pain    Musculoskeletal:  Negative for myalgias and neck pain.   Skin:  Negative for rash.   Neurological:  Negative for dizziness and headaches.   Endo/Heme/Allergies:  Does not bruise/bleed easily.   Psychiatric/Behavioral:  Negative  for depression and suicidal ideas.      Past Medical History  Scrotal abscess s/p I&D in Nov 2022    Surgical History  Reviewed     Family History    Family history reviewed with patient. There is no family history that is pertinent to the chief complaint.     Social History   reports that he has been smoking cigarettes. He has been smoking an average of .25 packs per day. He has never used smokeless tobacco. He reports current alcohol use.    Allergies  Allergies   Allergen Reactions    Keflex Hives    Penicillins        Medications  Prior to Admission Medications   Prescriptions Last Dose Informant Patient Reported? Taking?   Non Formulary Request 12/10/2022 at 2100  Yes Yes   Sig: Take 80 mg by mouth every evening. Latuda  Indications: mood swings   prazosin (MINIPRESS) 2 MG Cap 12/10/2022 at 2100  Yes Yes   Sig: Take 4 mg by mouth every evening. Indications: Frightening Dreams      Facility-Administered Medications: None       Physical Exam  Temp:  [37 °C (98.6 °F)] 37 °C (98.6 °F)  Pulse:  [80] 80  Resp:  [16] 16  BP: (125)/(71) 125/71  SpO2:  [95 %] 95 %  Blood Pressure: 125/71   Temperature: 37 °C (98.6 °F)   Pulse: 80   Respiration: 16   Pulse Oximetry: 95 %       Physical Exam  Vitals and nursing note reviewed.   Constitutional:       Comments: Morbidly obese    HENT:      Head: Normocephalic and atraumatic.      Right Ear: Tympanic membrane normal.      Left Ear: Tympanic membrane normal.      Nose: Nose normal.      Mouth/Throat:      Mouth: Mucous membranes are moist.      Pharynx: Oropharynx is clear.   Eyes:      Extraocular Movements: Extraocular movements intact.      Pupils: Pupils are equal, round, and reactive to light.   Cardiovascular:      Rate and Rhythm: Normal rate and regular rhythm.      Pulses: Normal pulses.      Heart sounds: Normal heart sounds.   Pulmonary:      Effort: Pulmonary effort is normal.      Breath sounds: Normal breath sounds.   Abdominal:      General: Bowel sounds are  normal. There is no distension.      Palpations: Abdomen is soft. There is no mass.   Genitourinary:     Comments: Area of induration between scrotum and anus draining serosanguineous fluid. Scrotal sac with drainage at previous I&D site   Musculoskeletal:         General: Normal range of motion.      Cervical back: Neck supple.   Skin:     General: Skin is warm.      Capillary Refill: Capillary refill takes less than 2 seconds.      Coloration: Skin is not jaundiced or pale.   Neurological:      General: No focal deficit present.      Mental Status: He is alert. Mental status is at baseline.   Psychiatric:         Mood and Affect: Mood normal.         Behavior: Behavior normal.       Laboratory:      WBC 13.1 hemoglobin 14.8, hematocrit 45.3, platelets 281, sodium 138, potassium 4.4, chloride 102, CO2 26, anion gap 10, glucose 26, BUN 12, creatinine 0.94, total bilirubin 0.29, ALT 21, AST 12, alk phos 22, lactic acid 1, procalcitonin 0.05, COVID-19/PCR/influenza A/B- sonogram at outside facility      Imaging:  US perineum  7 x 2.8 x 3.8 cm abscess and edematous tissue overlying.  Fluid become more liquefied and circumscribed and larger since prior sonogram.  Impression:   Complex fluid collection compatible with an abscess at the perineum    CT   R inferior perineal subcutaneous soft tissue compatible with phlegmon and abscess. Tiny focus of gas.     no X-Ray or EKG requiring interpretation    Assessment/Plan:  Justification for Admission Status  I anticipate this patient will require at least two midnights for appropriate medical management, necessitating inpatient admission because perineal abscess requiring IV antibiotics and surgical, urology consultation.     Patient will need a Med/Surg bed on Surgical  service .  The need is secondary to see above.    * Perineal abscess- (present on admission)  Assessment & Plan  Ultrasound scrotum showing complex fluid collection compatible with an abscess in the perineum.   7 x 2.8 x 3.8 cm.  CT showing right inferior perineal subcutaneous soft tissue compatible with phlegmon and early abscess.  Tiny foci of gas present.  No internal blood flow.    Received Levaquin, metronidazole and Flagyl prior to arrival  IV hydration   C/w Zyvox, Flagyl and Cefepime   F/u cultures   F/u CBC, metabolic panel   General surgery consulted-appreciate recommendations  N.p.o.  Analgesics      Diabetes mellitus (HCC)  Assessment & Plan  Reports remote history.  Not on any medications.  Check A1c  Keep blood glucose 140-180    Morbid obesity with BMI of 60.0-69.9, adult (HCC)  Assessment & Plan  BMI 60.46  Bariatric surgery outpatient referral on discharge      VTE prophylaxis: SCDs/TEDs

## 2022-12-13 NOTE — CARE PLAN
The patient is Stable - Low risk of patient condition declining or worsening         Progress made toward(s) clinical / shift goals:  sx intervention in AM. NPO at this time    Patient is not progressing towards the following goals:    Problem: Knowledge Deficit - Standard  Goal: Patient and family/care givers will demonstrate understanding of plan of care, disease process/condition, diagnostic tests and medications  Outcome: Progressing     Problem: Pain - Standard  Goal: Alleviation of pain or a reduction in pain to the patient’s comfort goal  Outcome: Progressing

## 2022-12-13 NOTE — ASSESSMENT & PLAN NOTE
Reports remote history.  Not on any medications.  A1c 7.3  Persistent hyperglycemia but slightly overall better control  ISS, avoid excessive sugars for wound healing

## 2022-12-13 NOTE — OR NURSING
1116: Pt arrived from OR post InD of perineal abscess. Pt is asleep. Sight dressing is CDI; some old drainage on cotton brief. Cardiac rhythm appears to be SR.    1130: Pt awake; lung sounds are wheezy; medicated per MAR.     1216: Lung sound are clear. Dressing is CDI.     1233: Report to KANA Hale.     1244: Pt up to edge of bed independently; dressing changed.      1252: Pt back to room via bed with transport. On 4L; tank is half full.

## 2022-12-13 NOTE — OP REPORT
Operative report  PreOp Diagnosis: Perineal abscess      PostOp Diagnosis: Right buttock abscess      Procedure(s):  INCISION AND DRAINAGE OF PERINEAL ABSCESS - Wound Class: Dirty or Infected    Surgeon(s):  Jack Headley D.O.    Anesthesiologist/Type of Anesthesia:  Anesthesiologist: John Jarquin M.D./* No anesthesia type entered *    Surgical Staff:  Circulator: Lilliana Lakhani R.N.  Limb Gallardo: Rosalva Alonzo R.N.; Bianca Santos R.N.  Scrub Person: Prasad Llanos    Specimens removed if any:  ID Type Source Tests Collected by Time Destination   1 : PERINEAL ABSCESS Tissue Perianal AEROBIC/ANAEROBIC CULTURE (SURGERY) Jack Headley D.O. 12/13/2022 10:57 AM        Estimated Blood Loss: 20 cc    Findings: Skin subcutaneous tissue abscess of the inferior portion of the right buttock extending onto the perineum.  No perianal or rectal involvement    Complications: None noted    Indication: 37-year-old male with recurrent perineal abscess after incision and drainage in the emergency department.    Operative report: Patient was taken to the operating room placed in the supine position on the operating table.  Is placed under general anesthesia and intubated by the anesthesiologist.  He is then placed in the lithotomy position and the perineum was prepped with Betadine.  Examination revealed a fluctuant fluid pocket on the inferior medial buttock away from the anal area but extending onto the perineum slightly.  The previous I&D site was easily visible.  Were able to express a fair amount of purulent material which was cultured.  We then opened the wound a couple of centimeters with a scalpel and used a clamp to open up loculations of the subcutaneous tissue.  There was not a significant amount of purulence or gas in the tissues.  Once this was completed we irrigated the wound fully.  Some hemostasis was done at the skin incision using the Bovie cautery.  We then packed the wound using 1 inch iodoform packing  strip.  Bulky gauze dressing was then placed.  The patient was returned to the supine position and transferred to his hospital bed.  He was then awakened from anesthesia and extubated.  Patient was then taken the postanesthesia care unit in stable condition.  The sponge, needle and instrument counts were correct at the end of the case.        12/13/2022 11:06 AM Jack Headley D.O.

## 2022-12-13 NOTE — PROGRESS NOTES
Assumed care of patient at 0700. Patient is alert and oriented, respirations are unlabored and regular on room air. Patient saturated through pad to bed. Complete linen change done, changed ABD pad to scrotum. Scrotal edema, redness, incision that is draining serosanguineous fluid. Lung sounds clear throughout, diminished in bases. Abdomen soft, non tender, +bowel sounds, BM PTA. Voiding without difficulty. Pain to scrotum, appropriate pain medication administered. Patient waiting for procedure this morning. Bed in lowest position, call light within reach, patient states no needs at this time.

## 2022-12-13 NOTE — CONSULTS
[facility] [place] Consult/H&P Note    Primary Service: hospitalist/general surgery  Attending: Patricio Bee M.D.  Patient's Name/MRN: Logan Walls, [id number]    Admit Date:12/12/2022  Today's Date: 12/13/2022   Length of stay:  LOS: 1 day   Room #: T426/00      Reason for consult/chief complaint: evolving perineal abscess  ID/HPI: Logan Walls is a 37 y.o. morbidly obese male patient with evolving perineal abscess, presenting for further evaluation. On Nov 18, he had a superficial abscess that was drained at an outlying facility and was sent home with PO abx. Since that time despite debridement he has had worsening pain/swelling, ultrasound was ordered and showed a 7x2.8x3.8cm abscess at the perineum without internal blood flow. CT abd/pelvis showed right inferior perineal subQ soft tissue compatible with phlegmon and early abscess, thus was transferred for potential surgical interventions. We were consulted by general surgery d/t the proximity of the abscess to the anterior scrotum. Denies any testicular pain, swelling. No LUTS.        Past Medical History:   No past medical history on file.     Past Surgical History:   No past surgical history on file.     Family History:   No family history on file.      Social History:   Social History     Tobacco Use    Smoking status: Every Day     Packs/day: 0.25     Types: Cigarettes    Smokeless tobacco: Never   Vaping Use    Vaping Use: Never used   Substance Use Topics    Alcohol use: Yes      Social History     Social History Narrative    Not on file        Allergies: he Keflex and Penicillins    Medications:   Medications Prior to Admission   Medication Sig Dispense Refill Last Dose    prazosin (MINIPRESS) 2 MG Cap Take 4 mg by mouth every evening. Indications: Frightening Dreams   12/10/2022 at 2100    Non Formulary Request Take 80 mg by mouth every evening. Latuda  Indications: mood swings   12/10/2022 at 2100         Review of Systems  Review of Systems  "  Constitutional:  Negative for chills and fever.   Gastrointestinal:  Negative for abdominal pain, nausea and vomiting.   Genitourinary:  Negative for dysuria, flank pain, frequency, hematuria and urgency.        Perineal pain/tenderness to the touch   All other systems reviewed and are negative.     Physical Exam  VITAL SIGNS: /73   Pulse 75   Temp 36.7 °C (98.1 °F) (Temporal)   Resp 16   Ht 1.88 m (6' 2\")   Wt (!) 214 kg (470 lb 14.4 oz)   SpO2 91%   BMI 60.46 kg/m²   Physical Exam  Vitals and nursing note reviewed.   Constitutional:       Appearance: He is obese.   HENT:      Head: Normocephalic and atraumatic.      Mouth/Throat:      Mouth: Mucous membranes are moist.   Eyes:      Pupils: Pupils are equal, round, and reactive to light.   Pulmonary:      Effort: Pulmonary effort is normal.   Abdominal:      Palpations: Abdomen is soft.   Genitourinary:     Comments: Perineal area with large amount of induration, erythema. Left side draining purulent discharge.   Scrotum- soft, no fluctuance, induration, crepitus. No obvious areas of necrosis  Buried penis  Skin:     General: Skin is warm.   Neurological:      Mental Status: He is alert and oriented to person, place, and time.   Psychiatric:         Mood and Affect: Mood normal.         Behavior: Behavior normal.         Labs:  Recent Labs     12/13/22 0121   WBC 11.6*   RBC 5.56   HEMOGLOBIN 14.1   HEMATOCRIT 45.0   MCV 80.9*   MCH 25.4*   MCHC 31.3*   RDW 41.2   PLATELETCT 301   MPV 9.8     Recent Labs     12/13/22  0121   SODIUM 134*   POTASSIUM 4.1   CHLORIDE 99   CO2 27   GLUCOSE 142*   BUN 13   CREATININE 0.65   CALCIUM 9.1         Glucose:  Recent Labs     12/13/22  0121   GLUCOSE 142*     Coags:  No results for input(s): INR in the last 72 hours.      Urinalysis:   No results for input(s): COLORURINE, CLARITY, SPECGRAVITY, PHURINE, GLUCOSEUR, KETONES, NITRITE, OCCULTBLOOD, RBCURINE, BACTERIA, EPITHELCELL in the last 72 hours.    Invalid " input(s): BURKE LION    Imaging:  IR-US GUIDED PIV   Final Result    Ultrasound-guided PERIPHERAL IV INSERTION performed by    qualified nursing staff as above.      US-FOREIGN FILM ULTRASOUND   Final Result          @lastct@     Assessment/Recommendation   37 y.o. male with evolving perineal absess, concern for anterior scrotal involvement. Work up at outlying facility: ultrasound showing 7x2.8x3.8cm abscess at the perineum without internal blood flow. CT abd/pelvis showed right inferior perineal subQ soft tissue compatible with phlegmon and early abscess.     PLAN:  Pt to go with general surgery today for debridement. At this point upon exam, there is no evidence of evolving abscess in the anterior scrotum. Please contact us during debridement if there is need for our services. Dr. Russell is aware of this consult and dictated the plan of care.        Erica Lackey, P.A.-C.   [address] [place] [id number]   [phone]

## 2022-12-13 NOTE — PROGRESS NOTES
Pt arrived to floor around midnight. A&ox 4  Ambulated to the room . Steady gait  O2 on RA  NPO at this time  Swelling/erythema to rt perianal area. Previous ID site leaking. Cx sent. Abd pad placed  Started on iv abx   C/o pain in perianal area. Medicated with dilaudid and oxy .   No available bariatric bed at this time per EVS.  Call light within reach. Hourly rounding in place

## 2022-12-13 NOTE — ANESTHESIA POSTPROCEDURE EVALUATION
Patient: Logan Walls    Procedure Summary     Date: 12/13/22 Room / Location: [facility] 06 / SURGERY [facility]    Anesthesia Start: 1028 Anesthesia Stop: 1120    Procedure: INCISION AND DRAINAGE OF PERINEAL ABSCESS Diagnosis:     Surgeons: Jack Headley D.O. Responsible Provider: John Jarquin M.D.    Anesthesia Type: general ASA Status: 3 - Emergent          Final Anesthesia Type: general  Last vitals  BP   Blood Pressure: 129/77    Temp   36.8 °C (98.3 °F)    Pulse   (!) 108   Resp   16    SpO2   89 %      Anesthesia Post Evaluation    Patient location during evaluation: PACU  Patient participation: complete - patient participated  Level of consciousness: awake and alert  Pain score: 5    Airway patency: patent  Anesthetic complications: no  Cardiovascular status: hemodynamically stable  Respiratory status: acceptable  Hydration status: euvolemic    PONV: none          No notable events documented.     Nurse Pain Score: 7 (NPRS)

## 2022-12-13 NOTE — DIETARY
NUTRITION SERVICES: BMI - Pt with BMI >40 (=Body mass index is 60.46 kg/m².), Class III obesity. Weight loss counseling not appropriate in acute care setting.     RECOMMEND - If appropriate at DC please refer to outpatient nutrition services for weight management.     RD available prn.

## 2022-12-13 NOTE — ANESTHESIA PROCEDURE NOTES
Airway    Date/Time: 12/13/2022 10:42 AM  Performed by: John Jarquin M.D.  Authorized by: John Jarquin M.D.     Location:  OR  Urgency:  Elective  Indications for Airway Management:  Anesthesia      Spontaneous Ventilation: absent    Sedation Level:  Deep  Preoxygenated: Yes    Patient Position:  Sniffing  Mask Difficulty Assessment:  2 - vent by mask + OA or adjuvant +/- NMBA  Final Airway Type:  Endotracheal airway  Final Endotracheal Airway:  ETT  Cuffed: Yes    Technique Used for Successful ETT Placement:  Direct laryngoscopy    Insertion Site:  Oral  Blade Type:  Bandar  Laryngoscope Blade/Videolaryngoscope Blade Size:  3  ETT Size (mm):  8.0  Measured from:  Teeth  ETT to Teeth (cm):  23  Placement Verified by: auscultation and capnometry    Cormack-Lehane Classification:  Grade III - view of epiglottis only  Number of Attempts at Approach:  1          
Detail Level: Detailed

## 2022-12-13 NOTE — CONSULTS
DATE OF CONSULTATION:  12/13/2022     REFERRING PHYSICIAN:   Hossein Lamas M.D.     CONSULTING PHYSICIAN:  Fredy Zuñiga M.D.     REASON FOR CONSULTATION:  I have been asked by  to see the patient in surgical consultation for evaluation of perineal abscess.  Patient reportedly underwent drainage of a scrotal/perineal abscess on November 18 at an urgent care.  He was treated with antibiotics on discharge.  Despite this this is continued to drain and on reevaluation with CAT scan shows it is extends deeper.  Patient has had no fevers or chills.  The sending facility treated him with antibiotics of metronidazole, levofloxacin and clindamycin.  Ultrasound done at that facility shows a 7 x 2.8 x 3.8 cm fluid collection cyst with an abscess.      Patient reportedly has remote history of diabetes but is on no medications.    He uses no tobacco products.  No alcohol use.  Does use marijuana.    HISTORY OF PRESENT ILLNESS: The patient is a 37 year-old White man who presents to the Emergency Department with a perineal abscess.    PAST MEDICAL HISTORY:  has no past medical history on file.    PAST SURGICAL HISTORY:  has no past surgical history on file.    ALLERGIES:   Allergies   Allergen Reactions    Keflex Hives    Penicillins        CURRENT MEDICATIONS:    Home Medications    **Home medications have not yet been reviewed for this encounter**         FAMILY HISTORY: family history is not on file.    SOCIAL HISTORY:  reports that he has been smoking cigarettes. He has been smoking an average of .25 packs per day. He has never used smokeless tobacco. He reports current alcohol use.    REVIEW OF SYSTEMS: Comprehensive review of systems is negative with the exception of the aforementioned HPI, PMH, and PSH bullets in accordance with CMS guidelines.    PHYSICAL EXAMINATION:    Physical Exam  Vitals and nursing note reviewed.   Constitutional:       General: He is sleeping.      Appearance: He is morbidly obese.    HENT:      Head: Normocephalic.      Nose: Nose normal.      Mouth/Throat:      Mouth: Mucous membranes are moist.   Eyes:      General: No scleral icterus.     Pupils: Pupils are equal, round, and reactive to light.   Cardiovascular:      Rate and Rhythm: Normal rate and regular rhythm.      Pulses: Normal pulses.   Pulmonary:      Effort: Pulmonary effort is normal. No respiratory distress.   Abdominal:      General: Bowel sounds are normal.      Palpations: Abdomen is soft.      Tenderness: There is no abdominal tenderness. There is no guarding or rebound.   Genitourinary:     Comments: Perineal swelling and tenderness    Musculoskeletal:         General: Normal range of motion.      Cervical back: Normal range of motion.   Skin:     General: Skin is warm and dry.      Capillary Refill: Capillary refill takes less than 2 seconds.   Neurological:      General: No focal deficit present.      Mental Status: He is oriented to person, place, and time and easily aroused.       LABORATORY VALUES:   Recent Labs     12/13/22  0121   WBC 11.6*   RBC 5.56   HEMOGLOBIN 14.1   HEMATOCRIT 45.0   MCV 80.9*   MCH 25.4*   MCHC 31.3*   RDW 41.2   PLATELETCT 301   MPV 9.8                    IMAGING:   US-FOREIGN FILM ULTRASOUND   Final Result          ASSESSMENT AND PLAN:     Perineal abscess- (present on admission)  Assessment & Plan  Ultrasound scrotum showing complex fluid collection compatible with an abscess in the perineum.  7 x 2.8 x 3.8 cm.    CT showing right inferior perineal subcutaneous soft tissue compatible with phlegmon and early abscess.  Tiny foci of gas present.    Received Levaquin, metronidazole and Flagyl prior to arrival  IV fluids       Zyvox, Flagyl and Cefepime    NPO    Diabetes mellitus (HCC)  Assessment & Plan  Reports remote history.  Not on any medications.  HbA1C - 7.3 (163)  Keep blood glucose 120-160     Morbid obesity with BMI of 60.0-69.9, adult (HCC)  Assessment & Plan  BMI 60.46    IV  ABX  NPO  OR in am for:  exam under anesthesia /  incision and drainage of perineal abscess.  Intraoperative cultures       ____________________________________     Fredy Zuñiga M.D.    DD: 12/13/2022  1:44 AM    [facility] NSQIP Surgical Risk Calculator

## 2022-12-13 NOTE — PROGRESS NOTES
4 Eyes Skin Assessment Completed by Rachel RN and Kaye RN.    Head WDL  Ears WDL  Nose WDL  Mouth WDL  Neck WDL  Breast/Chest WDL  Shoulder Blades WDL  Spine WDL  (R) Arm/Elbow/Hand Redness  (L) Arm/Elbow/Hand Redness  Abdomen Redness in abd folds  Groin Redness  Scrotum/Coccyx/Buttocks ; swollen/erythema in right perineal area  previous ID site in middle perianal; draining  (R) Leg WDL  (L) Leg WDL  (R) Heel/Foot/Toe Scar; dry; calloused  (L) Heel/Foot/Toe Scar; dry; calloused          Devices In Places Blood Pressure Cuff and Pulse Ox      Interventions In Place Pressure Redistribution Mattress    Possible Skin Injury No    Pictures Uploaded Into Epic Yes  Wound Consult Placed N/A  RN Wound Prevention Protocol Ordered No

## 2022-12-14 LAB
ANION GAP SERPL CALC-SCNC: 9 MMOL/L (ref 7–16)
BASOPHILS # BLD AUTO: 0.5 % (ref 0–1.8)
BASOPHILS # BLD: 0.07 K/UL (ref 0–0.12)
BUN SERPL-MCNC: 17 MG/DL (ref 8–22)
CALCIUM SERPL-MCNC: 9.1 MG/DL (ref 8.5–10.5)
CHLORIDE SERPL-SCNC: 100 MMOL/L (ref 96–112)
CO2 SERPL-SCNC: 26 MMOL/L (ref 20–33)
CREAT SERPL-MCNC: 0.66 MG/DL (ref 0.5–1.4)
EOSINOPHIL # BLD AUTO: 0.03 K/UL (ref 0–0.51)
EOSINOPHIL NFR BLD: 0.2 % (ref 0–6.9)
ERYTHROCYTE [DISTWIDTH] IN BLOOD BY AUTOMATED COUNT: 40.1 FL (ref 35.9–50)
GFR SERPLBLD CREATININE-BSD FMLA CKD-EPI: 124 ML/MIN/1.73 M 2
GLUCOSE BLD STRIP.AUTO-MCNC: 158 MG/DL (ref 65–99)
GLUCOSE BLD STRIP.AUTO-MCNC: 163 MG/DL (ref 65–99)
GLUCOSE BLD STRIP.AUTO-MCNC: 174 MG/DL (ref 65–99)
GLUCOSE BLD STRIP.AUTO-MCNC: 187 MG/DL (ref 65–99)
GLUCOSE SERPL-MCNC: 187 MG/DL (ref 65–99)
HCT VFR BLD AUTO: 44.2 % (ref 42–52)
HGB BLD-MCNC: 14.1 G/DL (ref 14–18)
IMM GRANULOCYTES # BLD AUTO: 0.07 K/UL (ref 0–0.11)
IMM GRANULOCYTES NFR BLD AUTO: 0.5 % (ref 0–0.9)
LYMPHOCYTES # BLD AUTO: 0.7 K/UL (ref 1–4.8)
LYMPHOCYTES NFR BLD: 5.4 % (ref 22–41)
MCH RBC QN AUTO: 25.5 PG (ref 27–33)
MCHC RBC AUTO-ENTMCNC: 31.9 G/DL (ref 33.7–35.3)
MCV RBC AUTO: 79.9 FL (ref 81.4–97.8)
MONOCYTES # BLD AUTO: 0.18 K/UL (ref 0–0.85)
MONOCYTES NFR BLD AUTO: 1.4 % (ref 0–13.4)
NEUTROPHILS # BLD AUTO: 11.8 K/UL (ref 1.82–7.42)
NEUTROPHILS NFR BLD: 92 % (ref 44–72)
NRBC # BLD AUTO: 0 K/UL
NRBC BLD-RTO: 0 /100 WBC
PLATELET # BLD AUTO: 264 K/UL (ref 164–446)
PMV BLD AUTO: 10.2 FL (ref 9–12.9)
POTASSIUM SERPL-SCNC: 3.8 MMOL/L (ref 3.6–5.5)
RBC # BLD AUTO: 5.53 M/UL (ref 4.7–6.1)
SODIUM SERPL-SCNC: 135 MMOL/L (ref 135–145)
WBC # BLD AUTO: 12.9 K/UL (ref 4.8–10.8)

## 2022-12-14 PROCEDURE — 82962 GLUCOSE BLOOD TEST: CPT | Mod: 91

## 2022-12-14 PROCEDURE — 700105 HCHG RX REV CODE 258: Performed by: STUDENT IN AN ORGANIZED HEALTH CARE EDUCATION/TRAINING PROGRAM

## 2022-12-14 PROCEDURE — 85025 COMPLETE CBC W/AUTO DIFF WBC: CPT

## 2022-12-14 PROCEDURE — 80048 BASIC METABOLIC PNL TOTAL CA: CPT

## 2022-12-14 PROCEDURE — 99232 SBSQ HOSP IP/OBS MODERATE 35: CPT | Performed by: INTERNAL MEDICINE

## 2022-12-14 PROCEDURE — 99232 SBSQ HOSP IP/OBS MODERATE 35: CPT | Performed by: REGISTERED NURSE

## 2022-12-14 PROCEDURE — A9270 NON-COVERED ITEM OR SERVICE: HCPCS | Performed by: REGISTERED NURSE

## 2022-12-14 PROCEDURE — 700111 HCHG RX REV CODE 636 W/ 250 OVERRIDE (IP): Performed by: REGISTERED NURSE

## 2022-12-14 PROCEDURE — 700102 HCHG RX REV CODE 250 W/ 637 OVERRIDE(OP): Performed by: REGISTERED NURSE

## 2022-12-14 PROCEDURE — 700111 HCHG RX REV CODE 636 W/ 250 OVERRIDE (IP): Performed by: STUDENT IN AN ORGANIZED HEALTH CARE EDUCATION/TRAINING PROGRAM

## 2022-12-14 PROCEDURE — 36415 COLL VENOUS BLD VENIPUNCTURE: CPT

## 2022-12-14 PROCEDURE — 700111 HCHG RX REV CODE 636 W/ 250 OVERRIDE (IP): Performed by: INTERNAL MEDICINE

## 2022-12-14 PROCEDURE — 99024 POSTOP FOLLOW-UP VISIT: CPT | Performed by: SURGERY

## 2022-12-14 PROCEDURE — 700102 HCHG RX REV CODE 250 W/ 637 OVERRIDE(OP): Performed by: INTERNAL MEDICINE

## 2022-12-14 PROCEDURE — 700101 HCHG RX REV CODE 250: Performed by: STUDENT IN AN ORGANIZED HEALTH CARE EDUCATION/TRAINING PROGRAM

## 2022-12-14 PROCEDURE — 770001 HCHG ROOM/CARE - MED/SURG/GYN PRIV*

## 2022-12-14 PROCEDURE — A9270 NON-COVERED ITEM OR SERVICE: HCPCS | Performed by: INTERNAL MEDICINE

## 2022-12-14 RX ORDER — OXYCODONE HYDROCHLORIDE 5 MG/1
5 TABLET ORAL
Status: DISCONTINUED | OUTPATIENT
Start: 2022-12-14 | End: 2022-12-15

## 2022-12-14 RX ORDER — OXYCODONE HYDROCHLORIDE 10 MG/1
10 TABLET ORAL
Status: DISCONTINUED | OUTPATIENT
Start: 2022-12-14 | End: 2022-12-15

## 2022-12-14 RX ORDER — HYDROMORPHONE HYDROCHLORIDE 1 MG/ML
0.5 INJECTION, SOLUTION INTRAMUSCULAR; INTRAVENOUS; SUBCUTANEOUS
Status: DISCONTINUED | OUTPATIENT
Start: 2022-12-14 | End: 2022-12-15

## 2022-12-14 RX ADMIN — OXYCODONE 5 MG: 5 TABLET ORAL at 08:01

## 2022-12-14 RX ADMIN — HYDROMORPHONE HYDROCHLORIDE 0.5 MG: 1 INJECTION, SOLUTION INTRAMUSCULAR; INTRAVENOUS; SUBCUTANEOUS at 23:17

## 2022-12-14 RX ADMIN — ACETAMINOPHEN 1000 MG: 500 TABLET ORAL at 23:18

## 2022-12-14 RX ADMIN — KETOROLAC TROMETHAMINE 30 MG: 30 INJECTION, SOLUTION INTRAMUSCULAR; INTRAVENOUS at 13:00

## 2022-12-14 RX ADMIN — KETOROLAC TROMETHAMINE 30 MG: 30 INJECTION, SOLUTION INTRAMUSCULAR; INTRAVENOUS at 04:52

## 2022-12-14 RX ADMIN — CEFEPIME 2 G: 2 INJECTION, POWDER, FOR SOLUTION INTRAVENOUS at 06:08

## 2022-12-14 RX ADMIN — HYDROMORPHONE HYDROCHLORIDE 0.5 MG: 1 INJECTION, SOLUTION INTRAMUSCULAR; INTRAVENOUS; SUBCUTANEOUS at 04:49

## 2022-12-14 RX ADMIN — METRONIDAZOLE 500 MG: 5 INJECTION, SOLUTION INTRAVENOUS at 22:00

## 2022-12-14 RX ADMIN — OXYCODONE 5 MG: 5 TABLET ORAL at 03:11

## 2022-12-14 RX ADMIN — PRAZOSIN HYDROCHLORIDE 4 MG: 2 CAPSULE ORAL at 21:38

## 2022-12-14 RX ADMIN — CEFEPIME 2 G: 2 INJECTION, POWDER, FOR SOLUTION INTRAVENOUS at 17:50

## 2022-12-14 RX ADMIN — OXYCODONE HYDROCHLORIDE 10 MG: 10 TABLET ORAL at 15:17

## 2022-12-14 RX ADMIN — KETOROLAC TROMETHAMINE 30 MG: 30 INJECTION, SOLUTION INTRAMUSCULAR; INTRAVENOUS at 17:42

## 2022-12-14 RX ADMIN — METRONIDAZOLE 500 MG: 5 INJECTION, SOLUTION INTRAVENOUS at 13:10

## 2022-12-14 RX ADMIN — HYDROMORPHONE HYDROCHLORIDE 0.5 MG: 1 INJECTION, SOLUTION INTRAMUSCULAR; INTRAVENOUS; SUBCUTANEOUS at 09:58

## 2022-12-14 RX ADMIN — ACETAMINOPHEN 1000 MG: 500 TABLET ORAL at 17:41

## 2022-12-14 RX ADMIN — ACETAMINOPHEN 1000 MG: 500 TABLET ORAL at 12:59

## 2022-12-14 RX ADMIN — LINEZOLID 600 MG: 600 INJECTION, SOLUTION INTRAVENOUS at 06:58

## 2022-12-14 RX ADMIN — NICOTINE POLACRILEX 2 MG: 2 GUM, CHEWING BUCCAL at 15:13

## 2022-12-14 RX ADMIN — HYDROMORPHONE HYDROCHLORIDE 0.5 MG: 1 INJECTION, SOLUTION INTRAMUSCULAR; INTRAVENOUS; SUBCUTANEOUS at 17:05

## 2022-12-14 RX ADMIN — METRONIDAZOLE 500 MG: 5 INJECTION, SOLUTION INTRAVENOUS at 04:54

## 2022-12-14 RX ADMIN — KETOROLAC TROMETHAMINE 30 MG: 30 INJECTION, SOLUTION INTRAMUSCULAR; INTRAVENOUS at 23:17

## 2022-12-14 RX ADMIN — OXYCODONE HYDROCHLORIDE 10 MG: 10 TABLET ORAL at 21:36

## 2022-12-14 ASSESSMENT — PAIN DESCRIPTION - PAIN TYPE
TYPE: ACUTE PAIN

## 2022-12-14 ASSESSMENT — ENCOUNTER SYMPTOMS
DIAPHORESIS: 0
CHILLS: 0
VOMITING: 0
NAUSEA: 0
FEVER: 0

## 2022-12-14 NOTE — PROGRESS NOTES
Hospital Medicine Daily Progress Note    Date of Service  12/13/2022    Chief Complaint  Logan Walls is a 37 y.o. male admitted 12/12/2022 with remote diabetes on no medications and morbid obesity who presented to our hospital with worsening perineal abscess after outpatient I*D and oral abx therapy. He was admitted, placed on expanded abx's and underwent I*D intra-operatively today.     Hospital Course  No notes on file    Interval Problem Update  Perineal abscess-underwent intra-op I*D. Patient has profuse sweating after surgery, but otherwise feels ok. Pain is tolerable. Afebrile overnight.   DM-A2c 7.3, persistent hyperglycemia noted.    I have discussed this patient's plan of care and discharge plan at IDT rounds today with Case Management, Nursing, Nursing leadership, and other members of the IDT team.    Consultants/Specialty  General surgery    Code Status  Full Code    Disposition  Patient is not medically cleared for discharge.   Anticipate discharge to to home with close outpatient follow-up.  I have placed the appropriate orders for post-discharge needs.    Review of Systems  Review of Systems   Constitutional:  Positive for diaphoresis. Negative for chills and fever.   Gastrointestinal:  Negative for nausea and vomiting.   Genitourinary:         Mild pain around the perineal area   All other systems reviewed and are negative.     Physical Exam  Temp:  [36 °C (96.8 °F)-37 °C (98.6 °F)] 36 °C (96.8 °F)  Pulse:  [] 89  Resp:  [14-53] 18  BP: (100-160)/(56-87) 100/59  SpO2:  [88 %-96 %] 92 %    Physical Exam  Vitals and nursing note reviewed.   Constitutional:       General: He is not in acute distress.     Appearance: He is well-developed.      Comments: Body mass index is 60.46 kg/m².  Sweaty   HENT:      Head: Normocephalic and atraumatic.      Mouth/Throat:      Pharynx: No oropharyngeal exudate.   Eyes:      General: No scleral icterus.     Pupils: Pupils are equal, round, and reactive to  light.   Neck:      Thyroid: No thyromegaly.   Cardiovascular:      Rate and Rhythm: Normal rate and regular rhythm.      Heart sounds: Normal heart sounds. No murmur heard.  Pulmonary:      Effort: Pulmonary effort is normal. No respiratory distress.      Breath sounds: Normal breath sounds. No wheezing.   Abdominal:      General: Bowel sounds are normal. There is no distension.      Palpations: Abdomen is soft.      Tenderness: There is no abdominal tenderness.   Genitourinary:     Comments: Surgical packing in place, appears dry  Musculoskeletal:         General: No tenderness. Normal range of motion.      Cervical back: Normal range of motion and neck supple.   Skin:     General: Skin is warm and dry.      Findings: No rash.   Neurological:      Mental Status: He is alert and oriented to person, place, and time.      Cranial Nerves: No cranial nerve deficit.       Fluids    Intake/Output Summary (Last 24 hours) at 12/13/2022 1648  Last data filed at 12/13/2022 1507  Gross per 24 hour   Intake 1170.65 ml   Output 515 ml   Net 655.65 ml       Laboratory  Recent Labs     12/13/22  0121   WBC 11.6*   RBC 5.56   HEMOGLOBIN 14.1   HEMATOCRIT 45.0   MCV 80.9*   MCH 25.4*   MCHC 31.3*   RDW 41.2   PLATELETCT 301   MPV 9.8     Recent Labs     12/13/22  0121   SODIUM 134*   POTASSIUM 4.1   CHLORIDE 99   CO2 27   GLUCOSE 142*   BUN 13   CREATININE 0.65   CALCIUM 9.1                   Imaging  IR-US GUIDED PIV   Final Result    Ultrasound-guided PERIPHERAL IV INSERTION performed by    qualified nursing staff as above.      US-FOREIGN FILM ULTRASOUND   Final Result           Assessment/Plan  * Perineal abscess- (present on admission)  Assessment & Plan  Ultrasound scrotum showing complex fluid collection compatible with an abscess in the perineum.  7 x 2.8 x 3.8 cm.  CT showing right inferior perineal subcutaneous soft tissue compatible with phlegmon and early abscess.  Tiny foci of gas present.  No internal blood flow.     Received Levaquin, metronidazole and Flagyl prior to arrival  IV hydration   C/w Zyvox, Flagyl and Cefepime   F/u cultures   Underwent intra-operative I*D on 12/13, no e/o necrotizing fasciitis  No changes to management at this time, adjust pain meds      Hyponatremia- (present on admission)  Assessment & Plan  Mild    Diabetes mellitus (HCC)- (present on admission)  Assessment & Plan  Reports remote history.  Not on any medications.  A1c 7.3  Persistent hyperglycemia  ISS, avoid excessive sugars for wound healing    Morbid obesity with BMI of 60.0-69.9, adult (HCC)- (present on admission)  Assessment & Plan  BMI 60.46  Bariatric surgery outpatient referral on discharge       VTE prophylaxis: enoxaparin ppx    I have performed a physical exam and reviewed and updated ROS and Plan today (12/13/2022). In review of yesterday's note (12/12/2022), there are no changes except as documented above.

## 2022-12-14 NOTE — PROGRESS NOTES
"  DATE: 12/14/2022    Post Operative Day  1 incision and drainage of  perirectal abscess .    INTERVAL EVENTS:  Remove packing.  Continue IV antibiotics.  Uncontrolled pain.    PHYSICAL EXAMINATION:  Vital Signs: /75   Pulse 99   Temp 36.3 °C (97.4 °F) (Temporal)   Resp 17   Ht 1.88 m (6' 2\")   Wt (!) 214 kg (470 lb 14.4 oz)   SpO2 92%     Reports perirectal tenderness, otherwise tolerating diet, denies fevers, chills.    LABORATORY VALUES:   Recent Labs     12/13/22  0121 12/14/22  1010   WBC 11.6* 12.9*   RBC 5.56 5.53   HEMOGLOBIN 14.1 14.1   HEMATOCRIT 45.0 44.2   MCV 80.9* 79.9*   MCH 25.4* 25.5*   MCHC 31.3* 31.9*   RDW 41.2 40.1   PLATELETCT 301 264   MPV 9.8 10.2     Recent Labs     12/13/22  0121 12/14/22  1010   SODIUM 134* 135   POTASSIUM 4.1 3.8   CHLORIDE 99 100   CO2 27 26   GLUCOSE 142* 187*   BUN 13 17   CREATININE 0.65 0.66   CALCIUM 9.1 9.1     Recent Labs     12/13/22  0121   ASTSGOT 17   ALTSGPT 19   TBILIRUBIN 0.5   ALKPHOSPHAT 89   GLOBULIN 3.7*            IMAGING:   IR-US GUIDED PIV   Final Result    Ultrasound-guided PERIPHERAL IV INSERTION performed by    qualified nursing staff as above.      US-FOREIGN FILM ULTRASOUND   Final Result          ASSESSMENT AND PLAN:   * Perineal abscess- (present on admission)  Assessment & Plan  12/13 Surgical incision and drainage of abscess  12/14  Remove packing, antibiotics, and wound consult.         ____________________________________     KAREY Delcid    DD: 12/14/2022  1:22 PM    "

## 2022-12-14 NOTE — PROGRESS NOTES
Report received from AM RN; assumed care. Assessment/2 RN skin check completed. A&O x 4. VSS. 89-92 on RA to 1L NC. Patient denying SOB, nausea, vomiting, dizziness. Numbness/tingling to BLE, right greater than left. Medicated for pain; see MAR. Fluffs changed to perineal area d/t drainage. Packing in place, but bothersome to patient. Abdomen rounded. Hyperactive BS x 2 upper/Hypoactive BS x 2 lower quadrants. + void to urinal. + eructation. -flatus. LBM PTA. Patient tolerating diabetic diet, insulin coverage provided Patient up stand pivot transfer to bed upon arrival. Patient refusing to mobilize d/t packing and difficulty with right leg with numbness. Discussed plan of care with patient. All questions answered.  HIgh fall risk. Bed/strip alarm engaged. Bed in locked/lowest position.  Call light/personal belongings within reach.  All needs met, patient ambulating at present time.     Patient refusing Tylenol d/t Pardo's esophagus and potential ulcer pending future diagnostics from GI in his area per patient report. Patient stated has had PTSD since he was a child and takes Latuda 80 mg nightly. Endorsed to AM RN to discuss with MD.

## 2022-12-14 NOTE — CARE PLAN
The patient is Stable - Low risk of patient condition declining or worsening    Shift Goals  Clinical Goals: Pain control, positioning, mobility, rest  Patient Goals: Pain control, rest.    Progress made toward(s) clinical / shift goals:  Medicated for pain; see MAR. Patient turning while in bed, repositioning. Patient requesting cane for intermittent RLE weakness. Patient slept intermittently during shift.     Patient is not progressing towards the following goals: NA.

## 2022-12-14 NOTE — PROGRESS NOTES
2 RN skin check complete.     Devices in place: NC, LFA PIV.   Skin assessed under devices: above as much as possible.    Obese. Psoriasis redness noted to back of right upper arm. Patient stated normally breaks out on head/scalp. Rounded abdomen. Redness/swelling noted to perineum. Incisional site to right scrotum with packing. Serosang drainage noted. 4x4 fluff/mesh underwear in place. Dry, cracked, calloused feet/heels. 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 Hyperkeratinized toenails. Scattered tattoos. No other skin demarcations/issues noted/reported.      Confirmed pressure ulcers found on: NA.  New potential pressure ulcers noted on: NA.   Wound consult placed: NA.   The following interventions in place: Multiple pillows for offloading/positioning.

## 2022-12-14 NOTE — PROGRESS NOTES
AA&Ox4. Denies CP/SOB.  Reporting 7/10 pain. Medicated per MAR.   Educated patient regarding pharmacologic and non pharmacologic modalities for pain management.  Skin per flowsheet.  Tolerating diabetic diet. Denies N/V.  + void. Last BM PTA.  Pt ambulates upself.  All needs met at this time. Call light within reach. Pt calls appropriately. Bed low and locked, non skid socks in place. Hourly rounding in place.

## 2022-12-14 NOTE — ASSESSMENT & PLAN NOTE
12/13 Surgical incision and drainage of abscess  12/14  Remove packing, antibiotics, and wound consult.  12/15 Wound consult pending.

## 2022-12-14 NOTE — CARE PLAN
The patient is Stable - Low risk of patient condition declining or worsening    Shift Goals  Clinical Goals: pain control; mobility  Patient Goals: pain control; mobility    Progress made toward(s) clinical / shift goals:  pain controlled with scheduled and PRN meds. Patient mobilized during shift.    Patient is not progressing towards the following goals:

## 2022-12-14 NOTE — PROGRESS NOTES
Hospital Medicine Daily Progress Note    Date of Service  12/14/2022    Chief Complaint  Logan Walls is a 37 y.o. male admitted 12/12/2022 with remote diabetes on no medications and morbid obesity who presented to our hospital with worsening perineal abscess after outpatient I*D and oral abx therapy. He was admitted, placed on expanded abx's and underwent I*D intra-operatively today.     Hospital Course  No notes on file    Interval Problem Update  Perineal abscess-underwent intra-op I*D on 12/12, doing ok. Still with significant pain. Afebrile. WBC tolu to 12.9.     DM-A2c 7.3, persistent hyperglycemia noted, remains about the same today.    I have discussed this patient's plan of care and discharge plan at IDT rounds today with Case Management, Nursing, Nursing leadership, and other members of the IDT team.    Consultants/Specialty  General surgery    Code Status  Full Code    Disposition  Patient is not medically cleared for discharge.   Anticipate discharge to to home with close outpatient follow-up.  I have placed the appropriate orders for post-discharge needs.    Review of Systems  Review of Systems   Constitutional:  Negative for chills, diaphoresis and fever.   Gastrointestinal:  Negative for nausea and vomiting.   Genitourinary:         Mild to moderate pain around the perineal area   All other systems reviewed and are negative.     Physical Exam  Temp:  [36 °C (96.8 °F)-36.8 °C (98.2 °F)] 36.3 °C (97.4 °F)  Pulse:  [67-99] 99  Resp:  [17-19] 17  BP: (100-135)/(59-83) 135/75  SpO2:  [92 %-97 %] 92 %    Physical Exam  Vitals and nursing note reviewed.   Constitutional:       General: He is not in acute distress.     Appearance: He is well-developed.      Comments: Body mass index is 60.46 kg/m².  Sweaty   HENT:      Head: Normocephalic and atraumatic.      Mouth/Throat:      Pharynx: No oropharyngeal exudate.   Eyes:      General: No scleral icterus.     Pupils: Pupils are equal, round, and reactive to  light.   Neck:      Thyroid: No thyromegaly.   Cardiovascular:      Rate and Rhythm: Normal rate and regular rhythm.      Heart sounds: Normal heart sounds. No murmur heard.  Pulmonary:      Effort: Pulmonary effort is normal. No respiratory distress.      Breath sounds: Normal breath sounds. No wheezing.   Abdominal:      General: Bowel sounds are normal. There is no distension.      Palpations: Abdomen is soft.      Tenderness: There is no abdominal tenderness.   Genitourinary:     Comments: Surgical packing in place, slightly indurated, but no clear leakage, moderate TTP in the perineal space  Musculoskeletal:         General: No tenderness. Normal range of motion.      Cervical back: Normal range of motion and neck supple.   Skin:     General: Skin is warm and dry.      Findings: No rash.   Neurological:      Mental Status: He is alert and oriented to person, place, and time.      Cranial Nerves: No cranial nerve deficit.       Fluids    Intake/Output Summary (Last 24 hours) at 12/14/2022 1446  Last data filed at 12/14/2022 1101  Gross per 24 hour   Intake 699.29 ml   Output 1025 ml   Net -325.71 ml       Laboratory  Recent Labs     12/13/22  0121 12/14/22  1010   WBC 11.6* 12.9*   RBC 5.56 5.53   HEMOGLOBIN 14.1 14.1   HEMATOCRIT 45.0 44.2   MCV 80.9* 79.9*   MCH 25.4* 25.5*   MCHC 31.3* 31.9*   RDW 41.2 40.1   PLATELETCT 301 264   MPV 9.8 10.2     Recent Labs     12/13/22  0121 12/14/22  1010   SODIUM 134* 135   POTASSIUM 4.1 3.8   CHLORIDE 99 100   CO2 27 26   GLUCOSE 142* 187*   BUN 13 17   CREATININE 0.65 0.66   CALCIUM 9.1 9.1                   Imaging  IR-US GUIDED PIV   Final Result    Ultrasound-guided PERIPHERAL IV INSERTION performed by    qualified nursing staff as above.      US-FOREIGN FILM ULTRASOUND   Final Result           Assessment/Plan  * Perineal abscess- (present on admission)  Assessment & Plan  Failed outpatient abx's and I*D  Stop zyvox given MRSA nares negative  Continue cefepime and  flagyl  F/u cultures, remain ngtd (unlikely to grow given prior abx's before cultures obtained)  Underwent intra-operative I*D on 12/13, no e/o necrotizing fasciitis  No changes to management at this time, adjust pain meds (minimal usage)  WBC rising slightly, if rises again, consider US imaging of affected area      Hyponatremia- (present on admission)  Assessment & Plan  Mild    Diabetes mellitus (HCC)- (present on admission)  Assessment & Plan  Reports remote history.  Not on any medications.  A1c 7.3  Persistent hyperglycemia but slightly overall better control  ISS, avoid excessive sugars for wound healing    Morbid obesity with BMI of 60.0-69.9, adult (HCC)- (present on admission)  Assessment & Plan  BMI 60.46  Bariatric surgery outpatient referral on discharge       VTE prophylaxis: enoxaparin ppx    I have performed a physical exam and reviewed and updated ROS and Plan today (12/14/2022). In review of yesterday's note (12/13/2022), there are no changes except as documented above.

## 2022-12-15 ENCOUNTER — HOSPITAL ENCOUNTER (OUTPATIENT)
Dept: RADIOLOGY | Facility: MEDICAL CENTER | Age: 37
End: 2022-12-15
Payer: COMMERCIAL

## 2022-12-15 LAB
BASOPHILS # BLD AUTO: 0.7 % (ref 0–1.8)
BASOPHILS # BLD: 0.05 K/UL (ref 0–0.12)
EOSINOPHIL # BLD AUTO: 0.43 K/UL (ref 0–0.51)
EOSINOPHIL NFR BLD: 5.8 % (ref 0–6.9)
ERYTHROCYTE [DISTWIDTH] IN BLOOD BY AUTOMATED COUNT: 41.8 FL (ref 35.9–50)
GLUCOSE BLD STRIP.AUTO-MCNC: 135 MG/DL (ref 65–99)
GLUCOSE BLD STRIP.AUTO-MCNC: 145 MG/DL (ref 65–99)
GLUCOSE BLD STRIP.AUTO-MCNC: 155 MG/DL (ref 65–99)
GLUCOSE BLD STRIP.AUTO-MCNC: 159 MG/DL (ref 65–99)
HCT VFR BLD AUTO: 40.8 % (ref 42–52)
HGB BLD-MCNC: 12.7 G/DL (ref 14–18)
IMM GRANULOCYTES # BLD AUTO: 0.02 K/UL (ref 0–0.11)
IMM GRANULOCYTES NFR BLD AUTO: 0.3 % (ref 0–0.9)
LYMPHOCYTES # BLD AUTO: 1.55 K/UL (ref 1–4.8)
LYMPHOCYTES NFR BLD: 21 % (ref 22–41)
MCH RBC QN AUTO: 25.6 PG (ref 27–33)
MCHC RBC AUTO-ENTMCNC: 31.1 G/DL (ref 33.7–35.3)
MCV RBC AUTO: 82.3 FL (ref 81.4–97.8)
MONOCYTES # BLD AUTO: 0.47 K/UL (ref 0–0.85)
MONOCYTES NFR BLD AUTO: 6.4 % (ref 0–13.4)
NEUTROPHILS # BLD AUTO: 4.87 K/UL (ref 1.82–7.42)
NEUTROPHILS NFR BLD: 65.8 % (ref 44–72)
NRBC # BLD AUTO: 0 K/UL
NRBC BLD-RTO: 0 /100 WBC
PLATELET # BLD AUTO: 248 K/UL (ref 164–446)
PMV BLD AUTO: 10 FL (ref 9–12.9)
RBC # BLD AUTO: 4.96 M/UL (ref 4.7–6.1)
WBC # BLD AUTO: 7.4 K/UL (ref 4.8–10.8)

## 2022-12-15 PROCEDURE — 700101 HCHG RX REV CODE 250: Performed by: STUDENT IN AN ORGANIZED HEALTH CARE EDUCATION/TRAINING PROGRAM

## 2022-12-15 PROCEDURE — A9270 NON-COVERED ITEM OR SERVICE: HCPCS | Performed by: REGISTERED NURSE

## 2022-12-15 PROCEDURE — 700111 HCHG RX REV CODE 636 W/ 250 OVERRIDE (IP): Performed by: REGISTERED NURSE

## 2022-12-15 PROCEDURE — 700105 HCHG RX REV CODE 258: Performed by: STUDENT IN AN ORGANIZED HEALTH CARE EDUCATION/TRAINING PROGRAM

## 2022-12-15 PROCEDURE — 36415 COLL VENOUS BLD VENIPUNCTURE: CPT

## 2022-12-15 PROCEDURE — 85025 COMPLETE CBC W/AUTO DIFF WBC: CPT

## 2022-12-15 PROCEDURE — 99024 POSTOP FOLLOW-UP VISIT: CPT | Performed by: REGISTERED NURSE

## 2022-12-15 PROCEDURE — 770001 HCHG ROOM/CARE - MED/SURG/GYN PRIV*

## 2022-12-15 PROCEDURE — 99024 POSTOP FOLLOW-UP VISIT: CPT | Performed by: SURGERY

## 2022-12-15 PROCEDURE — 700111 HCHG RX REV CODE 636 W/ 250 OVERRIDE (IP): Performed by: STUDENT IN AN ORGANIZED HEALTH CARE EDUCATION/TRAINING PROGRAM

## 2022-12-15 PROCEDURE — 97602 WOUND(S) CARE NON-SELECTIVE: CPT

## 2022-12-15 PROCEDURE — A9270 NON-COVERED ITEM OR SERVICE: HCPCS | Performed by: INTERNAL MEDICINE

## 2022-12-15 PROCEDURE — 700102 HCHG RX REV CODE 250 W/ 637 OVERRIDE(OP): Performed by: INTERNAL MEDICINE

## 2022-12-15 PROCEDURE — 700102 HCHG RX REV CODE 250 W/ 637 OVERRIDE(OP): Performed by: REGISTERED NURSE

## 2022-12-15 PROCEDURE — 82962 GLUCOSE BLOOD TEST: CPT | Mod: 91

## 2022-12-15 PROCEDURE — 700111 HCHG RX REV CODE 636 W/ 250 OVERRIDE (IP): Performed by: INTERNAL MEDICINE

## 2022-12-15 PROCEDURE — 99232 SBSQ HOSP IP/OBS MODERATE 35: CPT | Performed by: INTERNAL MEDICINE

## 2022-12-15 RX ORDER — LIDOCAINE HYDROCHLORIDE 40 MG/ML
SOLUTION TOPICAL
Status: DISCONTINUED | OUTPATIENT
Start: 2022-12-15 | End: 2022-12-16 | Stop reason: HOSPADM

## 2022-12-15 RX ORDER — HYDROMORPHONE HYDROCHLORIDE 1 MG/ML
0.5 INJECTION, SOLUTION INTRAMUSCULAR; INTRAVENOUS; SUBCUTANEOUS
Status: DISCONTINUED | OUTPATIENT
Start: 2022-12-15 | End: 2022-12-16 | Stop reason: HOSPADM

## 2022-12-15 RX ORDER — OXYCODONE HYDROCHLORIDE 15 MG/1
15 TABLET ORAL
Status: DISCONTINUED | OUTPATIENT
Start: 2022-12-15 | End: 2022-12-16 | Stop reason: HOSPADM

## 2022-12-15 RX ORDER — CIPROFLOXACIN 500 MG/1
500 TABLET, FILM COATED ORAL EVERY 12 HOURS
Status: DISCONTINUED | OUTPATIENT
Start: 2022-12-15 | End: 2022-12-16 | Stop reason: HOSPADM

## 2022-12-15 RX ORDER — METRONIDAZOLE 500 MG/1
500 TABLET ORAL EVERY 8 HOURS
Status: DISCONTINUED | OUTPATIENT
Start: 2022-12-15 | End: 2022-12-16 | Stop reason: HOSPADM

## 2022-12-15 RX ORDER — LIDOCAINE HYDROCHLORIDE 20 MG/ML
20 INJECTION, SOLUTION INFILTRATION; PERINEURAL
Status: DISCONTINUED | OUTPATIENT
Start: 2022-12-15 | End: 2022-12-16 | Stop reason: HOSPADM

## 2022-12-15 RX ORDER — OXYCODONE HYDROCHLORIDE 10 MG/1
10 TABLET ORAL
Status: DISCONTINUED | OUTPATIENT
Start: 2022-12-15 | End: 2022-12-16 | Stop reason: HOSPADM

## 2022-12-15 RX ADMIN — KETOROLAC TROMETHAMINE 30 MG: 30 INJECTION, SOLUTION INTRAMUSCULAR; INTRAVENOUS at 17:35

## 2022-12-15 RX ADMIN — ACETAMINOPHEN 1000 MG: 500 TABLET ORAL at 12:06

## 2022-12-15 RX ADMIN — HYDROMORPHONE HYDROCHLORIDE 0.5 MG: 1 INJECTION, SOLUTION INTRAMUSCULAR; INTRAVENOUS; SUBCUTANEOUS at 11:29

## 2022-12-15 RX ADMIN — METRONIDAZOLE 500 MG: 500 TABLET ORAL at 15:03

## 2022-12-15 RX ADMIN — KETOROLAC TROMETHAMINE 30 MG: 30 INJECTION, SOLUTION INTRAMUSCULAR; INTRAVENOUS at 06:03

## 2022-12-15 RX ADMIN — OXYCODONE HYDROCHLORIDE 15 MG: 15 TABLET ORAL at 20:11

## 2022-12-15 RX ADMIN — HYDROMORPHONE HYDROCHLORIDE 0.5 MG: 1 INJECTION, SOLUTION INTRAMUSCULAR; INTRAVENOUS; SUBCUTANEOUS at 16:53

## 2022-12-15 RX ADMIN — KETOROLAC TROMETHAMINE 30 MG: 30 INJECTION, SOLUTION INTRAMUSCULAR; INTRAVENOUS at 11:29

## 2022-12-15 RX ADMIN — OXYCODONE HYDROCHLORIDE 10 MG: 10 TABLET ORAL at 03:09

## 2022-12-15 RX ADMIN — CIPROFLOXACIN 500 MG: 500 TABLET, FILM COATED ORAL at 17:35

## 2022-12-15 RX ADMIN — ACETAMINOPHEN 1000 MG: 500 TABLET ORAL at 17:35

## 2022-12-15 RX ADMIN — PRAZOSIN HYDROCHLORIDE 4 MG: 2 CAPSULE ORAL at 20:11

## 2022-12-15 RX ADMIN — CIPROFLOXACIN 500 MG: 500 TABLET, FILM COATED ORAL at 12:06

## 2022-12-15 RX ADMIN — OXYCODONE HYDROCHLORIDE 15 MG: 15 TABLET ORAL at 23:30

## 2022-12-15 RX ADMIN — KETOROLAC TROMETHAMINE 30 MG: 30 INJECTION, SOLUTION INTRAMUSCULAR; INTRAVENOUS at 23:31

## 2022-12-15 RX ADMIN — CEFEPIME 2 G: 2 INJECTION, POWDER, FOR SOLUTION INTRAVENOUS at 06:21

## 2022-12-15 RX ADMIN — OXYCODONE HYDROCHLORIDE 10 MG: 10 TABLET ORAL at 06:21

## 2022-12-15 RX ADMIN — METRONIDAZOLE 500 MG: 5 INJECTION, SOLUTION INTRAVENOUS at 07:36

## 2022-12-15 RX ADMIN — METRONIDAZOLE 500 MG: 500 TABLET ORAL at 23:31

## 2022-12-15 RX ADMIN — ACETAMINOPHEN 1000 MG: 500 TABLET ORAL at 23:30

## 2022-12-15 RX ADMIN — OXYCODONE HYDROCHLORIDE 10 MG: 10 TABLET ORAL at 09:33

## 2022-12-15 RX ADMIN — OXYCODONE HYDROCHLORIDE 15 MG: 15 TABLET ORAL at 15:03

## 2022-12-15 RX ADMIN — ACETAMINOPHEN 1000 MG: 500 TABLET ORAL at 06:03

## 2022-12-15 ASSESSMENT — PAIN DESCRIPTION - PAIN TYPE
TYPE: ACUTE PAIN

## 2022-12-15 ASSESSMENT — COGNITIVE AND FUNCTIONAL STATUS - GENERAL
DRESSING REGULAR LOWER BODY CLOTHING: A LITTLE
DAILY ACTIVITIY SCORE: 22
DRESSING REGULAR UPPER BODY CLOTHING: A LITTLE
SUGGESTED CMS G CODE MODIFIER DAILY ACTIVITY: CJ
SUGGESTED CMS G CODE MODIFIER MOBILITY: CH
MOBILITY SCORE: 24

## 2022-12-15 ASSESSMENT — ENCOUNTER SYMPTOMS
NAUSEA: 0
FEVER: 0
CHILLS: 0
DIAPHORESIS: 0
VOMITING: 0
ROS GI COMMENTS: REMAINS ABOUT THE SAME

## 2022-12-15 NOTE — PROGRESS NOTES
AA&Ox4. Denies CP/SOB.  Reporting 8/10 pain. Medicated per MAR.   Educated patient regarding pharmacologic and non pharmacologic modalities for pain management.  Skin per flowsheet.  Tolerating diabetic diet. Denies N/V.  + void. Last BM PTA.  Pt ambulates upself.  All needs met at this time. Call light within reach. Pt calls appropriately. Bed low and locked, non skid socks in place. Hourly rounding in place.

## 2022-12-15 NOTE — PROGRESS NOTES
"    No acute changes  Wounds still painful    /65   Pulse (!) 54   Temp 35.9 °C (96.7 °F) (Temporal)   Resp 18   Ht 1.88 m (6' 2\")   Wt (!) 214 kg (470 lb 14.4 oz)   SpO2 96%   BMI 60.46 kg/m²     Perineum clear, no fluctuance  No erythema  No drainage  Wound clean    Recent Labs     12/13/22  0121 12/14/22  1010 12/15/22  0340   WBC 11.6* 12.9* 7.4   RBC 5.56 5.53 4.96   HEMOGLOBIN 14.1 14.1 12.7*   HEMATOCRIT 45.0 44.2 40.8*   MCV 80.9* 79.9* 82.3   MCH 25.4* 25.5* 25.6*   RDW 41.2 40.1 41.8   PLATELETCT 301 264 248   MPV 9.8 10.2 10.0   NEUTSPOLYS 67.50 92.00* 65.80   LYMPHOCYTES 20.30* 5.40* 21.00*   MONOCYTES 7.00 1.40 6.40   EOSINOPHILS 4.00 0.20 5.80   BASOPHILS 0.90 0.50 0.70     A/P  Buttock/perineal abscess S/P drainage  Drainage  Local care and hygiene strategies discussed    Surgery signing off  Follow up as needed for wound check  "

## 2022-12-15 NOTE — PROGRESS NOTES
"  DATE: 12/15/2022    Post Operative Day  2 incision and drainage of  perirectal abscess .    INTERVAL EVENTS:  Wound with small amount of s/s drainage.  Patient reports persistent pain.   Wound consult pending.  No surgical intervention at this time, surgery will sign off, please reach out with any questions.     PHYSICAL EXAMINATION:  Vital Signs: /65   Pulse (!) 54   Temp 35.9 °C (96.7 °F) (Temporal)   Resp 18   Ht 1.88 m (6' 2\")   Wt (!) 214 kg (470 lb 14.4 oz)   SpO2 96%     Reports perirectal tenderness, otherwise tolerating diet, denies fevers, chills.  Wound with small amount of s/s drainage, non purulent.     LABORATORY VALUES:   Recent Labs     12/13/22  0121 12/14/22  1010 12/15/22  0340   WBC 11.6* 12.9* 7.4   RBC 5.56 5.53 4.96   HEMOGLOBIN 14.1 14.1 12.7*   HEMATOCRIT 45.0 44.2 40.8*   MCV 80.9* 79.9* 82.3   MCH 25.4* 25.5* 25.6*   MCHC 31.3* 31.9* 31.1*   RDW 41.2 40.1 41.8   PLATELETCT 301 264 248   MPV 9.8 10.2 10.0     Recent Labs     12/13/22  0121 12/14/22  1010   SODIUM 134* 135   POTASSIUM 4.1 3.8   CHLORIDE 99 100   CO2 27 26   GLUCOSE 142* 187*   BUN 13 17   CREATININE 0.65 0.66   CALCIUM 9.1 9.1     Recent Labs     12/13/22  0121   ASTSGOT 17   ALTSGPT 19   TBILIRUBIN 0.5   ALKPHOSPHAT 89   GLOBULIN 3.7*            IMAGING:   IR-US GUIDED PIV   Final Result    Ultrasound-guided PERIPHERAL IV INSERTION performed by    qualified nursing staff as above.      US-FOREIGN FILM ULTRASOUND   Final Result          ASSESSMENT AND PLAN:   * Perineal abscess- (present on admission)  Assessment & Plan  12/13 Surgical incision and drainage of abscess  12/14  Remove packing, antibiotics, and wound consult.  12/15 Wound consult pending.         ____________________________________     KAREY Delcid DD: 12/14/2022  1:22 PM    "

## 2022-12-15 NOTE — DISCHARGE PLANNING
DC Transport Scheduled    Received request at: 12/15/2022 1224    Transport Company Scheduled:    Spoke with  Mayte at [facility] to schedule transport.  Unable to set up. [facility] stated it has to be set up on day of transport. Will need to call [phone] on 12/16 to set transport     If there are any changes needed to the DC transportation scheduled, please contact [facility] at ext. [phone] between the hours of 5104-5330 Mon-Fri. If outside those hours, contact the ED Case Manager at ext. [phone].

## 2022-12-15 NOTE — PROGRESS NOTES
Hospital Medicine Daily Progress Note    Date of Service  12/15/2022    Chief Complaint  Logan Walls is a 37 y.o. male admitted 12/12/2022 with remote diabetes on no medications and morbid obesity who presented to our hospital with worsening perineal abscess after outpatient I*D and oral abx therapy. He was admitted, placed on expanded abx's and underwent I*D intra-operatively today.     Hospital Course  No notes on file    Interval Problem Update  Perineal abscess-underwent intra-op I*D on 12/12, pain control is hit or miss for patient. He primarily uses MJ at home and prefers to limit his use of opiates for pain control. Remains afebrile, Cx's NGTD. Wound appears good with no draining.    DM-A2c 7.3, mild hyperglycemia persists.     I have discussed this patient's plan of care and discharge plan at IDT rounds today with Case Management, Nursing, Nursing leadership, and other members of the IDT team.    Consultants/Specialty  General surgery    Code Status  Full Code    Disposition  Patient is not medically cleared for discharge.   Anticipate discharge to to home with close outpatient follow-up.  I have placed the appropriate orders for post-discharge needs.    Review of Systems  Review of Systems   Constitutional:  Negative for chills, diaphoresis and fever.   Gastrointestinal:  Negative for nausea and vomiting.        Remains about the same   Genitourinary:         Mild to moderate pain around the perineal area   All other systems reviewed and are negative.     Physical Exam  Temp:  [35.9 °C (96.7 °F)-37 °C (98.6 °F)] 35.9 °C (96.7 °F)  Pulse:  [54-65] 54  Resp:  [17-18] 18  BP: (118-175)/(65-80) 122/65  SpO2:  [92 %-96 %] 96 %    Physical Exam  Vitals and nursing note reviewed.   Constitutional:       General: He is not in acute distress.     Appearance: He is well-developed.      Comments: Body mass index is 60.46 kg/m².  Sweaty   HENT:      Head: Normocephalic and atraumatic.      Mouth/Throat:      Pharynx:  No oropharyngeal exudate.   Eyes:      General: No scleral icterus.     Pupils: Pupils are equal, round, and reactive to light.   Neck:      Thyroid: No thyromegaly.   Cardiovascular:      Rate and Rhythm: Normal rate and regular rhythm.      Heart sounds: Normal heart sounds. No murmur heard.  Pulmonary:      Effort: Pulmonary effort is normal. No respiratory distress.      Breath sounds: Normal breath sounds. No wheezing.   Abdominal:      General: Bowel sounds are normal. There is no distension.      Palpations: Abdomen is soft.      Tenderness: There is no abdominal tenderness.   Genitourinary:     Comments: Incision site appears dry, no drainage, some induration noted  Normal appearing scrotum and penis  Musculoskeletal:         General: No tenderness. Normal range of motion.      Cervical back: Normal range of motion and neck supple.   Skin:     General: Skin is warm and dry.      Findings: No rash.   Neurological:      Mental Status: He is alert and oriented to person, place, and time.      Cranial Nerves: No cranial nerve deficit.       Fluids    Intake/Output Summary (Last 24 hours) at 12/15/2022 1538  Last data filed at 12/15/2022 0850  Gross per 24 hour   Intake 300 ml   Output 625 ml   Net -325 ml       Laboratory  Recent Labs     12/13/22  0121 12/14/22  1010 12/15/22  0340   WBC 11.6* 12.9* 7.4   RBC 5.56 5.53 4.96   HEMOGLOBIN 14.1 14.1 12.7*   HEMATOCRIT 45.0 44.2 40.8*   MCV 80.9* 79.9* 82.3   MCH 25.4* 25.5* 25.6*   MCHC 31.3* 31.9* 31.1*   RDW 41.2 40.1 41.8   PLATELETCT 301 264 248   MPV 9.8 10.2 10.0     Recent Labs     12/13/22  0121 12/14/22  1010   SODIUM 134* 135   POTASSIUM 4.1 3.8   CHLORIDE 99 100   CO2 27 26   GLUCOSE 142* 187*   BUN 13 17   CREATININE 0.65 0.66   CALCIUM 9.1 9.1                   Imaging  IR-US GUIDED PIV   Final Result    Ultrasound-guided PERIPHERAL IV INSERTION performed by    qualified nursing staff as above.      US-FOREIGN FILM ULTRASOUND   Final Result            Assessment/Plan  * Perineal abscess- (present on admission)  Assessment & Plan  Failed outpatient abx's and I*D  Change abx's to oral cipro/flagyl  F/u cultures, remain ngtd (unlikely to grow given prior abx's before cultures obtained)  Underwent intra-operative I*D on 12/13, no e/o necrotizing fasciitis  No changes to management at this time, adjust pain meds (minimal usage)  WBC normalized, afebrile, wound appears fine  Discharge home on 12/16 with MTM    Hyponatremia- (present on admission)  Assessment & Plan  Mild    Diabetes mellitus (HCC)- (present on admission)  Assessment & Plan  Reports remote history.  Not on any medications.  A1c 7.3  Persistent hyperglycemia but slightly overall better control  ISS, avoid excessive sugars for wound healing    Morbid obesity with BMI of 60.0-69.9, adult (HCC)- (present on admission)  Assessment & Plan  BMI 60.46  Bariatric surgery outpatient referral on discharge       VTE prophylaxis: enoxaparin ppx    I have performed a physical exam and reviewed and updated ROS and Plan today (12/15/2022). In review of yesterday's note (12/14/2022), there are no changes except as documented above.

## 2022-12-15 NOTE — PROGRESS NOTES
4 Eyes Skin Assessment Completed by KANA Roberts and KANA Bennett.    Head WDL  Ears WDL  Nose WDL  Mouth WDL  Neck WDL  Breast/Chest WDL  Shoulder Blades WDL  Spine WDL  (R) Arm/Elbow/Hand WDL  (L) Arm/Elbow/Hand WDL  Abdomen WDL  Groin WDL  Scrotum/Coccyx/Buttocks WDL  (R) Leg WDL  (L) Leg WDL  (R) Heel/Foot/Toe WDL  (L) Heel/Foot/Toe WDL          Devices In Places Pulse Ox and SCD's      Interventions In Place Pillows    Possible Skin Injury No    Pictures Uploaded Into Epic N/A  Wound Consult Placed N/A  RN Wound Prevention Protocol Ordered No

## 2022-12-15 NOTE — WOUND TEAM
[facility]  Inpatient Services  Initial Wound and Skin Care Evaluation    Admission Date: 12/12/2022     Last order of IP CONSULT TO WOUND CARE was found on 12/15/2022 from Hospital Encounter on 12/12/2022     HPI, PMH, SH: Reviewed    Past Surgical History:   Procedure Laterality Date    UT I&D PERIRECTAL ABSCESS Right 12/13/2022    Procedure: INCISION AND DRAINAGE OF RIGHT BUTTOCK ABSCESS ;  Surgeon: Jack Headley D.O.;  Location: SURGERY [facility];  Service: General     Social History     Tobacco Use    Smoking status: Every Day     Packs/day: 0.25     Types: Cigarettes    Smokeless tobacco: Never   Substance Use Topics    Alcohol use: Yes     No chief complaint on file.    Diagnosis: Perineal abscess [L02.215]    Unit where seen by Wound Team: T426/00     WOUND CONSULT/FOLLOW UP RELATED TO:  Left perineal wound     WOUND HISTORY:  Pt is a 37yr old mobidly obese male admitted from outside facility related to perineal abscess. Pt previously had an I&D on 12/12 at outside facility however continued to have significant pain and drainage. Wound was evaluated by surgery and I&D was completed.    WOUND ASSESSMENT/LDA  Wound 12/13/22 Full Thickness Wound Perineum Left (Active)   Wound Image      12/15/22 1200   Site Assessment Red;Yellow    Periwound Assessment Clean;Dry;Intact    Margins Attached edges;Defined edges    Closure Open to air    Drainage Amount Scant    Drainage Description Serosanguineous    Treatments Cleansed;Site care;Offloading    Wound Cleansing Approved Wound Cleanser    Periwound Protectant Skin Protectant Wipes to Periwound    Dressing Cleansing/Solutions Not Applicable    Dressing Options Absorbent Abdominal Pad    Dressing Changed New    Dressing Status Clean;Dry;Intact    Dressing Change/Treatment Frequency Every Shift, and As Needed    NEXT Dressing Change/Treatment Date 12/15/22    NEXT Weekly Photo (Inpatient Only) 12/22/22    Non-staged Wound Description Full thickness     Wound Length (cm) 1 cm    Wound Width (cm) 0.5 cm    Wound Depth (cm) 2.1 cm    Wound Surface Area (cm^2) 0.5 cm^2    Wound Volume (cm^3) 1.05 cm^3    Tunneling (cm) 4.8 cm    Tunneling Clock Position of Wound 12    Shape Linear oval    Wound Odor None    Exposed Structures JOSEFINA    WOUND NURSE ONLY - Time Spent with Patient (mins) 60    Number of days: 2        Vascular:    EUSEBIO:   No results found.    Lab Values:    Lab Results   Component Value Date/Time    WBC 7.4 12/15/2022 03:40 AM    RBC 4.96 12/15/2022 03:40 AM    HEMOGLOBIN 12.7 (L) 12/15/2022 03:40 AM    HEMATOCRIT 40.8 (L) 12/15/2022 03:40 AM    HBA1C 7.3 (H) 12/13/2022 01:21 AM        Culture Results show:  No results found for this or any previous visit (from the past 720 hour(s)).    Pain Level/Medicated:  Pain with palpation of wound, tolerated without pain medications       INTERVENTIONS BY WOUND TEAM:  Chart and images reviewed. Discussed with bedside RN. All areas of concern (based on picture review, LDA review and discussion with bedside RN) have been thoroughly assessed. Documentation of areas based on significant findings. This RN in to assess patient. Performed standard wound care which includes appropriate positioning, dressing removal and non-selective debridement. Pictures and measurements obtained weekly if/when required.  Preparation for Dressing removal: NA AMEE  Non-selectively Debrided with:  Wound cleanser and gauze.  Sharp debridement: NA  Meera wound: Cleansed with Wound cleanser, Prepped with No sting  Primary Dressing: ABD pad  Secondary (Outer) Dressing: Briefs    Interdisciplinary consultation: Patient, Bedside RNSam (Wound RN) and Physician Dr. Headley    EVALUATION / RATIONALE FOR TREATMENT:  Most Recent Date:  12/15/22: Pt with abscess which was opened in surgery. Pt stable for DC. Per surgeon no need to pack. Pt to keep wound clean by showering daily.      Goals: Steady decrease in wound area and depth weekly.    WOUND  TEAM PLAN OF CARE ([X] for frequency of wound follow up,):   Nursing to follow dressing orders written for wound care. Contact wound team if area fails to progress, deteriorates or with any questions/concerns if something comes up before next scheduled follow up (See below as to whether wound is following and frequency of wound follow up)  Dressing changes by wound team:                   Follow up 3 times weekly:                NPWT change 3 times weekly:     Follow up 1-2 times weekly:      Follow up Bi-Monthly:           Follow up Monthly (High Risk):                        Follow up as needed:   X  Other (explain):     NURSING PLAN OF CARE ORDERS (X):  Dressing changes: See Dressing Care orders:   Skin care: See Skin Care orders:   RN Prevention Protocol:   Rectal tube care: See Rectal Tube Care orders:   Other orders:    RSKIN:   CURRENTLY IN PLACE (X), APPLIED THIS VISIT (A), ORDERED (O):   Q shift Lazarus:  X  Q shift pressure point assessments:  X    Surface/Positioning   Pressure redistribution mattress    X        Low Airloss          ICU Low Airloss   Bariatric LANCE     Waffle cushion        Waffle Overlay          Reposition q 2 hours      TAPs Turning system     Z Ted Pillow     Offloading/Redistribution   Sacral Mepilex (Silicone dressing)     Heel Mepilex (Silicone dressing)         Heel float boots (Prevalon boot)             Float Heels off Bed with Pillows           Respiratory Room Air  Silicone O2 tubing         Gray Foam Ear protectors     Cannula fixation Device (Tender )          High flow offloading Clip    Elastic head band offloading device      Anchorfast                                                         Trach with Optifoam split foam             Containment/Moisture Prevention Continent    Rectal tube or BMS    Purwick/Condom Cath        Navarro Catheter    Barrier wipes           Barrier paste       Antifungal tx      Interdry        Mobilization       Up to chair    X    Ambulate     X  PT/OT      Nutrition       Dietician        Diabetes Education      PO  X   TF     TPN     NPO   # days     Other        Anticipated discharge plans:   LTACH:        SNF/Rehab:                  Home Health Care:           Outpatient Wound Center:            Self/Family Care:     X    Other:                  Vac Discharge Needs:   Not Applicable Pt not on a wound vac:   X    Regular Vac while inpatient, alternative dressing at DC:        Regular Vac in use and continued at DC:            Reg. Vac w/ Skin Sub/Biologic in use. Will need to be changed 2x wkly:      Veraflo Vac while inpatient, ok to transition to Regular Vac on Discharge:           Veraflo Vac while inpatient, will need to remain on Veraflo Vac upon discharge:
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
## 2022-12-15 NOTE — PROGRESS NOTES
Bedside report received.  Assessment complete.  A&O x 4. Patient calls appropriately.  Patient ambulates with minimal assist. Bed alarm off.   Patient has 9/10 pain. Pain managed with prescribed medications.  Denies N&V. Tolerating CHO diet.  Surgical I&D site: scrotal site CDI and AMEE. Minimal drainage.   + void, states UO is less and darker than baseline, + flatus, - BM.  Patient denies SOB.  SCD's refused.  Review plan with of care with patient. Call light and personal belongings within reach. Hourly rounding in place. All needs met at this time.

## 2022-12-15 NOTE — DISCHARGE PLANNING
"Case Management Discharge Planning    Admission Date: 12/12/2022  GMLOS: 3.2  ALOS: 3    6-Clicks ADL Score: 22  6-Clicks Mobility Score: 24      Anticipated Discharge Dispo: Discharge Disposition: Discharged to home/self care (01)  Discharge Address: [address]  Discharge Contact Phone Number: [phone]    DME Needed: No    Action(s) Taken: OTHER. Discussed this case during IDT Rounds. Per MD, patient is expected to be medically clear tomorrow 12/16/22.  LSW met with patient at bedside to discuss transportation back to [place], [place].     Patient claimed he does not have anyone to pick him up. Patient claimed he does not have money to pay for the transportation. Patient stated he assumed [facility] was responsible for transport him back home as \"I didn't ask to come here.\"    Per Alejandra Bedside RN, patient is mobile and is appropriate to transport him home via regular vehicle.    LSW escalated to SHAWN Patiño Supervisor. Per CHERYL Patiño Benefits have been verified. LSW faxed the Transportation Form and Facesheets.    LSW spoke with Enid MARINA Ride Line Coordinator. LSW provided the information obtained by SHAWN Patiño Supervisor regarding benefits and contact information to schedule the transportation.    Per CHERYL Rossi is aware this is long distance transportation; however, they only schedule transportation on the day of discharge; therefor, [facility] Line will call tomorrow morning to set up transportation.     Escalations Completed: Leadership    Medically Clear: No    Next Steps: Awaiting update from [employer] regarding transportation.    Barriers to Discharge: Medical clearance    Is the patient up for discharge tomorrow: Yes       "

## 2022-12-15 NOTE — CARE PLAN
The patient is Stable - Low risk of patient condition declining or worsening    Shift Goals  Clinical Goals: pain control, mobility  Patient Goals: pain control, rest  Family Goals: JOSEFINA    Progress made toward(s) clinical / shift goals:      Patient's pain is controlled with a combination of PRN, scheduled, and non-pharmacologic pain management. Patient remains free from falls with frequent mobilization and effective use of call light.     Problem: Pain - Standard  Goal: Alleviation of pain or a reduction in pain to the patient’s comfort goal  Outcome: Progressing     Problem: Fall Risk  Goal: Patient will remain free from falls  Outcome: Progressing

## 2022-12-16 ENCOUNTER — PHARMACY VISIT (OUTPATIENT)
Dept: PHARMACY | Facility: MEDICAL CENTER | Age: 37
End: 2022-12-16
Payer: COMMERCIAL

## 2022-12-16 VITALS
WEIGHT: 315 LBS | OXYGEN SATURATION: 97 % | HEART RATE: 68 BPM | DIASTOLIC BLOOD PRESSURE: 93 MMHG | RESPIRATION RATE: 17 BRPM | BODY MASS INDEX: 40.43 KG/M2 | TEMPERATURE: 97.6 F | SYSTOLIC BLOOD PRESSURE: 124 MMHG | HEIGHT: 74 IN

## 2022-12-16 LAB
BACTERIA SPEC ANAEROBE CULT: ABNORMAL
BACTERIA SPEC ANAEROBE CULT: ABNORMAL
BACTERIA WND AEROBE CULT: ABNORMAL
BACTERIA WND AEROBE CULT: ABNORMAL
GLUCOSE BLD STRIP.AUTO-MCNC: 134 MG/DL (ref 65–99)
GLUCOSE BLD STRIP.AUTO-MCNC: 150 MG/DL (ref 65–99)
GRAM STN SPEC: ABNORMAL
SIGNIFICANT IND 70042: ABNORMAL
SIGNIFICANT IND 70042: ABNORMAL
SITE SITE: ABNORMAL
SITE SITE: ABNORMAL
SOURCE SOURCE: ABNORMAL
SOURCE SOURCE: ABNORMAL

## 2022-12-16 PROCEDURE — A9270 NON-COVERED ITEM OR SERVICE: HCPCS | Performed by: INTERNAL MEDICINE

## 2022-12-16 PROCEDURE — 700111 HCHG RX REV CODE 636 W/ 250 OVERRIDE (IP): Performed by: INTERNAL MEDICINE

## 2022-12-16 PROCEDURE — 700102 HCHG RX REV CODE 250 W/ 637 OVERRIDE(OP): Performed by: INTERNAL MEDICINE

## 2022-12-16 PROCEDURE — 99239 HOSP IP/OBS DSCHRG MGMT >30: CPT | Performed by: INTERNAL MEDICINE

## 2022-12-16 PROCEDURE — RXMED WILLOW AMBULATORY MEDICATION CHARGE: Performed by: INTERNAL MEDICINE

## 2022-12-16 PROCEDURE — 700102 HCHG RX REV CODE 250 W/ 637 OVERRIDE(OP): Performed by: REGISTERED NURSE

## 2022-12-16 PROCEDURE — A9270 NON-COVERED ITEM OR SERVICE: HCPCS | Performed by: REGISTERED NURSE

## 2022-12-16 PROCEDURE — 82962 GLUCOSE BLOOD TEST: CPT

## 2022-12-16 RX ORDER — SENNA AND DOCUSATE SODIUM 50; 8.6 MG/1; MG/1
1 TABLET, FILM COATED ORAL DAILY
Qty: 30 TABLET | Refills: 0 | Status: SHIPPED | OUTPATIENT
Start: 2022-12-16

## 2022-12-16 RX ORDER — METRONIDAZOLE 500 MG/1
500 TABLET ORAL EVERY 8 HOURS
Qty: 6 TABLET | Refills: 0 | Status: SHIPPED | OUTPATIENT
Start: 2022-12-16 | End: 2022-12-18

## 2022-12-16 RX ORDER — ACETAMINOPHEN 500 MG
1000 TABLET ORAL EVERY 6 HOURS
Qty: 30 TABLET | Refills: 0 | Status: SHIPPED | OUTPATIENT
Start: 2022-12-16

## 2022-12-16 RX ORDER — OXYCODONE HYDROCHLORIDE 10 MG/1
10 TABLET ORAL
Qty: 10 TABLET | Refills: 0 | Status: SHIPPED | OUTPATIENT
Start: 2022-12-16 | End: 2022-12-21

## 2022-12-16 RX ORDER — IBUPROFEN 800 MG/1
800 TABLET ORAL 3 TIMES DAILY PRN
Qty: 30 TABLET | Refills: 0 | Status: SHIPPED | OUTPATIENT
Start: 2022-12-16

## 2022-12-16 RX ORDER — CIPROFLOXACIN 500 MG/1
500 TABLET, FILM COATED ORAL EVERY 12 HOURS
Qty: 4 TABLET | Refills: 0 | Status: SHIPPED | OUTPATIENT
Start: 2022-12-16 | End: 2022-12-18

## 2022-12-16 RX ADMIN — ACETAMINOPHEN 1000 MG: 500 TABLET ORAL at 11:43

## 2022-12-16 RX ADMIN — KETOROLAC TROMETHAMINE 30 MG: 30 INJECTION, SOLUTION INTRAMUSCULAR; INTRAVENOUS at 11:43

## 2022-12-16 RX ADMIN — OXYCODONE HYDROCHLORIDE 15 MG: 15 TABLET ORAL at 11:42

## 2022-12-16 RX ADMIN — OXYCODONE HYDROCHLORIDE 15 MG: 15 TABLET ORAL at 05:42

## 2022-12-16 RX ADMIN — ACETAMINOPHEN 1000 MG: 500 TABLET ORAL at 05:42

## 2022-12-16 RX ADMIN — KETOROLAC TROMETHAMINE 30 MG: 30 INJECTION, SOLUTION INTRAMUSCULAR; INTRAVENOUS at 05:41

## 2022-12-16 RX ADMIN — METRONIDAZOLE 500 MG: 500 TABLET ORAL at 05:42

## 2022-12-16 RX ADMIN — CIPROFLOXACIN 500 MG: 500 TABLET, FILM COATED ORAL at 05:42

## 2022-12-16 ASSESSMENT — PAIN DESCRIPTION - PAIN TYPE
TYPE: ACUTE PAIN
TYPE: ACUTE PAIN

## 2022-12-16 NOTE — PROGRESS NOTES
Discharging patient home per physician order.  Patient discharged with Kb bustillos.  Patient demonstrated understanding of discharge instructions, follow up appointments, home medications, prescriptions, and home care for surgical wound.  Patient ambulating independently, voiding without difficulty, pain well controlled, tolerating oral medications, oxygen saturation greater than 90%, tolerating diet.   Educational handouts on perianal abscess and incision and drainage given and discussed.  Patient verbalized understanding of discharge instructions and educational handouts.  All questions answered.  Belongings with patient at time of discharge. Meds-to-beds previously picked up by patient and with patient at time of discharge.

## 2022-12-16 NOTE — CARE PLAN
The patient is Stable - Low risk of patient condition declining or worsening    Shift Goals  Clinical Goals: Pain control, discharge planning  Patient Goals: Pain control, discharge planning  Family Goals: JOSEFINA    Progress made toward(s) clinical / shift goals:  PRN and scheduled pain medications administered per MAR. Discharge orders received. Patient awaiting set-up for transportation home to [place], [place]. Case management assisting.    Patient is not progressing towards the following goals: N/A

## 2022-12-16 NOTE — DISCHARGE PLANNING
I have been in communication with Laura from [facility] member transport services. As of this morning, a ride is being scheduled by them, turn around time is 24 hours or less per Laura. The confirmation number is [id number] and I will update care team and notes when ride time is confirmed for PT.

## 2022-12-16 NOTE — DISCHARGE PLANNING
DC Transport Scheduled    Received request at: 1224 on 12/16/2022    Transport Company Scheduled:  [facility] [employer]  Spoke with Laura and Lou at [facility] transport to schedule transport.  Confirmation # [id number]    Scheduled Date: 12/16/2022  Scheduled Time: 1300/1330    Destination: HOME  [address] [facility]    Notified care team of scheduled transport via Voalte.     If there are any changes needed to the DC transportation scheduled, please contact [facility] at ext. [phone] between the hours of 3352-7745 Mon-Fri. If outside those hours, contact the ED Case Manager at ext. [phone].

## 2022-12-16 NOTE — DISCHARGE PLANNING
Case Management Discharge Planning    Admission Date: 12/12/2022  GMLOS: 3.2  ALOS: 4    6-Clicks ADL Score: 22  6-Clicks Mobility Score: 24      Anticipated Discharge Dispo: Discharge Disposition: Discharged to home/self care (01)  Discharge Address: [address]  Discharge Contact Phone Number: [phone]    DME Needed: No    Action(s) Taken: OTHER. Per MD, patient is medically clear for discharge home. LSW notified Mara Winn, Ride Line Coordinator.    Per Mara, [employer] is arranging transportation from [place] to [place], [place].    1:10pm - LSW requested an update from [employer] regarding patient's transportation home.    Per Mara, [facility] will  this patient today at 1:30pm, KANA Amaro notified via Voalte by LSW.    Escalations Completed: None    Medically Clear: Yes    Next Steps: Awaiting update from [employer].    Barriers to Discharge: None    Is the patient up for discharge tomorrow:

## 2022-12-16 NOTE — CARE PLAN
The patient is Stable - Low risk of patient condition declining or worsening    Shift Goals  Clinical Goals: pain control; mobility  Patient Goals: pain control; rest  Family Goals: JOSEFINA    Progress made toward(s) clinical / shift goals:  pain controlled during shift with PRN and scheduled meds. Patient is upself.    Patient is not progressing towards the following goals:

## 2022-12-16 NOTE — PROGRESS NOTES
Received report of patient at start of shift. Patient is AOx4, PRN and scheduled medications administered for complaints of pain. Assessment complete, patient on room air. Patient ambulating frequently. Discharge orders received. Patient awaiting set-up for transportation home to [place], [place]. Case management assisting. Patient updated on plan of care/discharge plan, encouraged to notify staff for any needs/assistance. Call light within reach.

## 2022-12-16 NOTE — PROGRESS NOTES
Bedside report received.  Assessment complete.  A&O x 4. Patient calls appropriately.  Patient ambulates with no assist. Bed alarm off.   Patient has 7/10 pain. Pain managed with prescribed medications.  Denies N&V. Tolerating CHO diet.  Surgical  I&D site: scrotal site CDI and AMEE. No drainage noted.   + void, + flatus, + BM.  Patient denies SOB.  SCD's refused.  Review plan with of care with patient. Call light and personal belongings within reach. Hourly rounding in place. All needs met at this time.

## 2022-12-16 NOTE — DISCHARGE SUMMARY
Discharge Summary    CHIEF COMPLAINT ON ADMISSION  No chief complaint on file.      Reason for Admission  Perineal abscess     Admission Date  12/12/2022    CODE STATUS  Full Code    HPI & HOSPITAL COURSE  This is a 37 y.o. male here with morbid obesity (Body mass index is 60.46 kg/m².), MJ use, PTSD, MDD who presented to our hospital with a perineal abscess which failed an outpatient I*D and oral antibiotics. He was admitted to the hospital, placed on broad spectrum antibiotics and general surgical consultation. Admission imaging showed large abscess in the perineal region with some gas. He underwent the following procedure:    PreOp Diagnosis: Perineal abscess        PostOp Diagnosis: Right buttock abscess        Procedure(s):  INCISION AND DRAINAGE OF PERINEAL ABSCESS - Wound Class: Dirty or Infected     There was no evidence of nathanael-anal or anal involvement. Packing was removed on POD#2. Wound care evaluated. Mild induration, but on drainage. His intra-operative fluid cultures remain no growth, likely since he was on oral and IV antibiotics before the procedure was done. MRSA nares was negative. He will be transitioned to oral abx's and finished course as outlined below.     Lastly, he was diagnosed with DM with an A1c of 7.5. He was started on low dose metformin. He was highly encouraged to seek outpatient nutritional counseling for his weight.     No notes on file    Therefore, he is discharged in good and stable condition to home with close outpatient follow-up.    The patient met 2-midnight criteria for an inpatient stay at the time of discharge.    Discharge Date  12/16/2022    FOLLOW UP ITEMS POST DISCHARGE  F/U with PCP in [facility] in 1 week for post-operative wound check.    DISCHARGE DIAGNOSES  Principal Problem:    Perineal abscess POA: Yes  Active Problems:    Morbid obesity with BMI of 60.0-69.9, adult (HCC) POA: Yes    Diabetes mellitus (HCC) POA: Yes    Hyponatremia POA: Yes  Resolved  Problems:    Super obese POA: Yes      FOLLOW UP  No future appointments.  [facility]  [id number]  [place] [address]  [phone]    Schedule an appointment as soon as possible for a visit in 1 week(s)  ACS follow up clinic      MEDICATIONS ON DISCHARGE     Medication List        START taking these medications        Instructions   acetaminophen 500 MG Tabs  Commonly known as: TYLENOL   Take 2 Tablets by mouth every 6 hours.  Dose: 1,000 mg     ciprofloxacin 500 MG Tabs  Commonly known as: CIPRO   Take 1 Tablet by mouth every 12 hours for 2 days.  Dose: 500 mg     ibuprofen 800 MG Tabs  Commonly known as: MOTRIN   Take 1 Tablet by mouth 3 times a day as needed for Mild Pain or Moderate Pain.  Dose: 800 mg     metFORMIN 500 MG Tabs  Commonly known as: GLUCOPHAGE   Take 1 Tablet by mouth 2 times a day with meals.  Dose: 500 mg     metroNIDAZOLE 500 MG Tabs  Commonly known as: FLAGYL   Take 1 Tablet by mouth every 8 hours for 2 days.  Dose: 500 mg     oxyCODONE immediate release 10 MG immediate release tablet  Commonly known as: ROXICODONE   Take 1 Tablet by mouth every 3 hours as needed for Moderate Pain or Severe Pain for up to 5 days.  Dose: 10 mg     sennosides-docusate sodium 8.6-50 MG tablet  Commonly known as: SENOKOT-S   Take 1 Tablet by mouth every day.  Dose: 1 Tablet            CONTINUE taking these medications        Instructions   Non Formulary Request   Take 80 mg by mouth every evening. Latuda  Indications: mood swings  Dose: 80 mg     prazosin 2 MG Caps  Commonly known as: MINIPRESS   Take 4 mg by mouth every evening. Indications: Frightening Dreams  Dose: 4 mg              Allergies  Allergies   Allergen Reactions    Keflex Hives    Penicillins        DIET  Orders Placed This Encounter   Procedures    Diet Order Diet: Consistent CHO (Diabetic)     Standing Status:   Standing     Number of Occurrences:   1     Order Specific Question:   Diet:     Answer:   Consistent CHO (Diabetic)  [4]       ACTIVITY  As tolerated.  Weight bearing as tolerated    CONSULTATIONS  GS    PROCEDURES  As noted above    OUTSIDE IMAGES-CT ABDOMEN /PELVIS   Final Result      OUTSIDE IMAGES-CT ABDOMEN /PELVIS   Final Result      IR-US GUIDED PIV   Final Result    Ultrasound-guided PERIPHERAL IV INSERTION performed by    qualified nursing staff as above.      US-FOREIGN FILM ULTRASOUND   Final Result            LABORATORY  Lab Results   Component Value Date    SODIUM 135 12/14/2022    POTASSIUM 3.8 12/14/2022    CHLORIDE 100 12/14/2022    CO2 26 12/14/2022    GLUCOSE 187 (H) 12/14/2022    BUN 17 12/14/2022    CREATININE 0.66 12/14/2022        Lab Results   Component Value Date    WBC 7.4 12/15/2022    HEMOGLOBIN 12.7 (L) 12/15/2022    HEMATOCRIT 40.8 (L) 12/15/2022    PLATELETCT 248 12/15/2022        Total time of the discharge process exceeds 43 minutes.

## 2022-12-17 LAB
BACTERIA WND AEROBE CULT: ABNORMAL
BACTERIA WND AEROBE CULT: ABNORMAL
GRAM STN SPEC: ABNORMAL
SIGNIFICANT IND 70042: ABNORMAL
SITE SITE: ABNORMAL
SOURCE SOURCE: ABNORMAL

## 2022-12-18 LAB
BACTERIA BLD CULT: NORMAL
BACTERIA BLD CULT: NORMAL
SIGNIFICANT IND 70042: NORMAL
SIGNIFICANT IND 70042: NORMAL
SITE SITE: NORMAL
SITE SITE: NORMAL
SOURCE SOURCE: NORMAL
SOURCE SOURCE: NORMAL

## (undated) DEVICE — ELECTRODE DUAL RETURN W/ CORD - (50/PK)

## (undated) DEVICE — SODIUM CHL IRRIGATION 0.9% 1000ML (12EA/CA)

## (undated) DEVICE — SUCTION INSTRUMENT YANKAUER BULBOUS TIP W/O VENT (50EA/CA)

## (undated) DEVICE — Device

## (undated) DEVICE — DRESSING ABDOMINAL PAD STERILE 8 X 10" (360EA/CA)"

## (undated) DEVICE — COVER LIGHT HANDLE ALC PLUS DISP (18EA/BX)

## (undated) DEVICE — CANISTER SUCTION 3000ML MECHANICAL FILTER AUTO SHUTOFF MEDI-VAC NONSTERILE LF DISP  (40EA/CA)

## (undated) DEVICE — GAUZE PACKING STRIP STERILE IODOFORM 1 IN X 5 YDS

## (undated) DEVICE — MAT PATIENT POSITIONING PREVALON (10EA/CA)

## (undated) DEVICE — SENSOR OXIMETER ADULT SPO2 RD SET (20EA/BX)

## (undated) DEVICE — SWAB CULTURE AMIES ESWAB (50EA/PK)

## (undated) DEVICE — TUBING CLEARLINK DUO-VENT - C-FLO (48EA/CA)

## (undated) DEVICE — BLADE SURGICAL #15 - (50/BX 3BX/CA)

## (undated) DEVICE — BRIEF STRETCH MATERNITY M/L - FITS 20-60IN (5EA/BG 20BG/CA)

## (undated) DEVICE — SET LEADWIRE 5 LEAD BEDSIDE DISPOSABLE ECG (1SET OF 5/EA)

## (undated) DEVICE — GLOVE BIOGEL SZ 8 SURGICAL PF LTX - (50PR/BX 4BX/CA)

## (undated) DEVICE — TOWEL STOP TIMEOUT SAFETY FLAG (40EA/CA)

## (undated) DEVICE — SET EXTENSION WITH 2 PORTS (48EA/CA) ***PART #2C8610 IS A SUBSTITUTE*****

## (undated) DEVICE — TRAY SRGPRP PVP IOD WT PRP - (20/CA)

## (undated) DEVICE — GOWN WARMING STANDARD FLEX - (30/CA)

## (undated) DEVICE — GLOVE BIOGEL PI ORTHO SZ 8 PF LF (40PR/BX)

## (undated) DEVICE — LACTATED RINGERS INJ 1000 ML - (14EA/CA 60CA/PF)